# Patient Record
Sex: MALE | Race: OTHER | NOT HISPANIC OR LATINO | URBAN - METROPOLITAN AREA
[De-identification: names, ages, dates, MRNs, and addresses within clinical notes are randomized per-mention and may not be internally consistent; named-entity substitution may affect disease eponyms.]

---

## 2017-02-15 ENCOUNTER — INPATIENT (INPATIENT)
Facility: HOSPITAL | Age: 32
LOS: 5 days | Discharge: HOME | End: 2017-02-21
Attending: HOSPITALIST | Admitting: HOSPITALIST
Payer: COMMERCIAL

## 2017-02-15 VITALS
HEART RATE: 110 BPM | TEMPERATURE: 98 F | RESPIRATION RATE: 20 BRPM | OXYGEN SATURATION: 98 % | HEIGHT: 68 IN | WEIGHT: 164.91 LBS | SYSTOLIC BLOOD PRESSURE: 125 MMHG | DIASTOLIC BLOOD PRESSURE: 86 MMHG

## 2017-02-15 LAB
ALBUMIN SERPL ELPH-MCNC: 3.6 G/DL — SIGNIFICANT CHANGE UP (ref 3.3–5)
ALP SERPL-CCNC: 88 U/L — SIGNIFICANT CHANGE UP (ref 40–120)
ALT FLD-CCNC: 45 U/L — SIGNIFICANT CHANGE UP (ref 12–78)
ANION GAP SERPL CALC-SCNC: 9 MMOL/L — SIGNIFICANT CHANGE UP (ref 5–17)
ANISOCYTOSIS BLD QL: SLIGHT — SIGNIFICANT CHANGE UP
APTT BLD: 30.8 SEC — SIGNIFICANT CHANGE UP (ref 27.5–37.4)
AST SERPL-CCNC: 30 U/L — SIGNIFICANT CHANGE UP (ref 15–37)
BASOPHILS NFR BLD AUTO: 1 % — SIGNIFICANT CHANGE UP (ref 0–2)
BILIRUB SERPL-MCNC: 0.7 MG/DL — SIGNIFICANT CHANGE UP (ref 0.2–1.2)
BLD GP AB SCN SERPL QL: SIGNIFICANT CHANGE UP
BUN SERPL-MCNC: 13 MG/DL — SIGNIFICANT CHANGE UP (ref 7–23)
CALCIUM SERPL-MCNC: 8.5 MG/DL — SIGNIFICANT CHANGE UP (ref 8.5–10.1)
CHLORIDE SERPL-SCNC: 105 MMOL/L — SIGNIFICANT CHANGE UP (ref 96–108)
CO2 SERPL-SCNC: 27 MMOL/L — SIGNIFICANT CHANGE UP (ref 22–31)
CREAT SERPL-MCNC: 0.72 MG/DL — SIGNIFICANT CHANGE UP (ref 0.5–1.3)
EOSINOPHIL NFR BLD AUTO: 3 % — SIGNIFICANT CHANGE UP (ref 0–6)
GLUCOSE SERPL-MCNC: 110 MG/DL — HIGH (ref 70–99)
HCT VFR BLD CALC: 42.7 % — SIGNIFICANT CHANGE UP (ref 39–50)
HGB BLD-MCNC: 15.2 G/DL — SIGNIFICANT CHANGE UP (ref 13–17)
INR BLD: 1 RATIO — SIGNIFICANT CHANGE UP (ref 0.88–1.16)
LACTATE SERPL-SCNC: 0.7 MMOL/L — SIGNIFICANT CHANGE UP (ref 0.7–2)
LIDOCAIN IGE QN: 536 U/L — HIGH (ref 73–393)
LYMPHOCYTES # BLD AUTO: 7 % — LOW (ref 13–44)
MCHC RBC-ENTMCNC: 28.8 PG — SIGNIFICANT CHANGE UP (ref 27–34)
MCHC RBC-ENTMCNC: 35.5 GM/DL — SIGNIFICANT CHANGE UP (ref 32–36)
MCV RBC AUTO: 81 FL — SIGNIFICANT CHANGE UP (ref 80–100)
MONOCYTES NFR BLD AUTO: 4 % — SIGNIFICANT CHANGE UP (ref 2–14)
NEUTROPHILS NFR BLD AUTO: 82 % — HIGH (ref 43–77)
NEUTS BAND # BLD: 3 % — SIGNIFICANT CHANGE UP (ref 0–8)
PLAT MORPH BLD: NORMAL — SIGNIFICANT CHANGE UP
PLATELET # BLD AUTO: 329 K/UL — SIGNIFICANT CHANGE UP (ref 150–400)
POTASSIUM SERPL-MCNC: 3.9 MMOL/L — SIGNIFICANT CHANGE UP (ref 3.5–5.3)
POTASSIUM SERPL-SCNC: 3.9 MMOL/L — SIGNIFICANT CHANGE UP (ref 3.5–5.3)
PROT SERPL-MCNC: 7 GM/DL — SIGNIFICANT CHANGE UP (ref 6–8.3)
PROTHROM AB SERPL-ACNC: 11.2 SEC — SIGNIFICANT CHANGE UP (ref 10–13.1)
RBC # BLD: 5.27 M/UL — SIGNIFICANT CHANGE UP (ref 4.2–5.8)
RBC # FLD: 11.4 % — SIGNIFICANT CHANGE UP (ref 11–15)
RBC BLD AUTO: SIGNIFICANT CHANGE UP
SODIUM SERPL-SCNC: 141 MMOL/L — SIGNIFICANT CHANGE UP (ref 135–145)
WBC # BLD: 21.2 K/UL — HIGH (ref 3.8–10.5)
WBC # FLD AUTO: 21.2 K/UL — HIGH (ref 3.8–10.5)

## 2017-02-15 PROCEDURE — 74177 CT ABD & PELVIS W/CONTRAST: CPT | Mod: 26

## 2017-02-15 PROCEDURE — 99285 EMERGENCY DEPT VISIT HI MDM: CPT

## 2017-02-15 RX ORDER — IOHEXOL 300 MG/ML
30 INJECTION, SOLUTION INTRAVENOUS ONCE
Qty: 0 | Refills: 0 | Status: COMPLETED | OUTPATIENT
Start: 2017-02-15 | End: 2017-02-15

## 2017-02-15 RX ORDER — KETOROLAC TROMETHAMINE 30 MG/ML
30 SYRINGE (ML) INJECTION ONCE
Qty: 0 | Refills: 0 | Status: DISCONTINUED | OUTPATIENT
Start: 2017-02-15 | End: 2017-02-15

## 2017-02-15 RX ORDER — SODIUM CHLORIDE 9 MG/ML
1000 INJECTION INTRAMUSCULAR; INTRAVENOUS; SUBCUTANEOUS
Qty: 0 | Refills: 0 | Status: COMPLETED | OUTPATIENT
Start: 2017-02-15 | End: 2017-02-15

## 2017-02-15 RX ORDER — VANCOMYCIN HCL 1 G
1000 VIAL (EA) INTRAVENOUS ONCE
Qty: 0 | Refills: 0 | Status: COMPLETED | OUTPATIENT
Start: 2017-02-15 | End: 2017-02-15

## 2017-02-15 RX ORDER — PIPERACILLIN AND TAZOBACTAM 4; .5 G/20ML; G/20ML
3.38 INJECTION, POWDER, LYOPHILIZED, FOR SOLUTION INTRAVENOUS ONCE
Qty: 0 | Refills: 0 | Status: COMPLETED | OUTPATIENT
Start: 2017-02-15 | End: 2017-02-15

## 2017-02-15 RX ORDER — SODIUM CHLORIDE 9 MG/ML
3 INJECTION INTRAMUSCULAR; INTRAVENOUS; SUBCUTANEOUS ONCE
Qty: 0 | Refills: 0 | Status: COMPLETED | OUTPATIENT
Start: 2017-02-15 | End: 2017-02-15

## 2017-02-15 RX ADMIN — PIPERACILLIN AND TAZOBACTAM 200 GRAM(S): 4; .5 INJECTION, POWDER, LYOPHILIZED, FOR SOLUTION INTRAVENOUS at 22:30

## 2017-02-15 RX ADMIN — SODIUM CHLORIDE 3 MILLILITER(S): 9 INJECTION INTRAMUSCULAR; INTRAVENOUS; SUBCUTANEOUS at 20:35

## 2017-02-15 RX ADMIN — Medication 30 MILLIGRAM(S): at 22:21

## 2017-02-15 RX ADMIN — Medication 30 MILLIGRAM(S): at 20:40

## 2017-02-15 RX ADMIN — SODIUM CHLORIDE 2000 MILLILITER(S): 9 INJECTION INTRAMUSCULAR; INTRAVENOUS; SUBCUTANEOUS at 20:40

## 2017-02-15 RX ADMIN — SODIUM CHLORIDE 2000 MILLILITER(S): 9 INJECTION INTRAMUSCULAR; INTRAVENOUS; SUBCUTANEOUS at 22:21

## 2017-02-15 RX ADMIN — Medication 250 MILLIGRAM(S): at 23:33

## 2017-02-15 RX ADMIN — IOHEXOL 30 MILLILITER(S): 300 INJECTION, SOLUTION INTRAVENOUS at 20:45

## 2017-02-15 NOTE — ED ADULT TRIAGE NOTE - CHIEF COMPLAINT QUOTE
patient c/o of pain redness ans swelling L groin aria started last night  , patient was send here form urgent care for evaluation cellulitis , patient denied testicular pain or swelling

## 2017-02-15 NOTE — ED PROVIDER NOTE - OBJECTIVE STATEMENT
31yoM; with no signif pmh; now p/w inguinal pain x2 days. patient statees he noticed some bulging over left inguinal area. today area became increasingly swollen, erythematous, and painful. reports mild chills today. denies n/v/d. c/o mild abd pain. denies dysuria, hematuria, frequency, urgency. denies testicular pain.

## 2017-02-15 NOTE — ED PROVIDER NOTE - PHYSICAL EXAMINATION
General:     NAD  Head:     NC/AT, EOMI, oral mucosa moist  Neck:     trachea midline  Lungs:     CTA b/l, no w/r/r  CVS:     S1S2, RRR, no m/g/r  Abd:     +BS, ttp @ LLQ, no rebound, +voluntary guarding, s/nd, no organomegaly  Genital Exam (chaperoned by ED Ludy Day):  nt over bilateral testicles.  ttp over left inguinal area with swelling and erythema  Ext:    2+ radial and pedal pulses, no c/c/e  Neuro: AAOx3, no sensory/motor deficits

## 2017-02-16 DIAGNOSIS — G40.909 EPILEPSY, UNSPECIFIED, NOT INTRACTABLE, WITHOUT STATUS EPILEPTICUS: ICD-10-CM

## 2017-02-16 DIAGNOSIS — L03.314 CELLULITIS OF GROIN: ICD-10-CM

## 2017-02-16 DIAGNOSIS — D72.829 ELEVATED WHITE BLOOD CELL COUNT, UNSPECIFIED: ICD-10-CM

## 2017-02-16 LAB
ANION GAP SERPL CALC-SCNC: 10 MMOL/L — SIGNIFICANT CHANGE UP (ref 5–17)
APPEARANCE UR: CLEAR — SIGNIFICANT CHANGE UP
BASOPHILS # BLD AUTO: 0.2 K/UL — SIGNIFICANT CHANGE UP (ref 0–0.2)
BASOPHILS NFR BLD AUTO: 1.1 % — SIGNIFICANT CHANGE UP (ref 0–2)
BILIRUB UR-MCNC: NEGATIVE — SIGNIFICANT CHANGE UP
BUN SERPL-MCNC: 8 MG/DL — SIGNIFICANT CHANGE UP (ref 7–23)
CALCIUM SERPL-MCNC: 8.2 MG/DL — LOW (ref 8.5–10.1)
CHLORIDE SERPL-SCNC: 111 MMOL/L — HIGH (ref 96–108)
CO2 SERPL-SCNC: 23 MMOL/L — SIGNIFICANT CHANGE UP (ref 22–31)
COLOR SPEC: YELLOW — SIGNIFICANT CHANGE UP
CREAT SERPL-MCNC: 0.64 MG/DL — SIGNIFICANT CHANGE UP (ref 0.5–1.3)
DIFF PNL FLD: NEGATIVE — SIGNIFICANT CHANGE UP
EOSINOPHIL # BLD AUTO: 0.5 K/UL — SIGNIFICANT CHANGE UP (ref 0–0.5)
EOSINOPHIL NFR BLD AUTO: 2.9 % — SIGNIFICANT CHANGE UP (ref 0–6)
GLUCOSE SERPL-MCNC: 110 MG/DL — HIGH (ref 70–99)
GLUCOSE UR QL: NEGATIVE MG/DL — SIGNIFICANT CHANGE UP
HCT VFR BLD CALC: 41.3 % — SIGNIFICANT CHANGE UP (ref 39–50)
HGB BLD-MCNC: 14.5 G/DL — SIGNIFICANT CHANGE UP (ref 13–17)
KETONES UR-MCNC: NEGATIVE — SIGNIFICANT CHANGE UP
LACTATE SERPL-SCNC: 0.6 MMOL/L — LOW (ref 0.7–2)
LEUKOCYTE ESTERASE UR-ACNC: NEGATIVE — SIGNIFICANT CHANGE UP
LYMPHOCYTES # BLD AUTO: 12.8 % — LOW (ref 13–44)
LYMPHOCYTES # BLD AUTO: 2.3 K/UL — SIGNIFICANT CHANGE UP (ref 1–3.3)
MCHC RBC-ENTMCNC: 28.2 PG — SIGNIFICANT CHANGE UP (ref 27–34)
MCHC RBC-ENTMCNC: 35.2 GM/DL — SIGNIFICANT CHANGE UP (ref 32–36)
MCV RBC AUTO: 80.2 FL — SIGNIFICANT CHANGE UP (ref 80–100)
MONOCYTES # BLD AUTO: 1.3 K/UL — HIGH (ref 0–0.9)
MONOCYTES NFR BLD AUTO: 7.3 % — SIGNIFICANT CHANGE UP (ref 2–14)
NEUTROPHILS # BLD AUTO: 13.9 K/UL — HIGH (ref 1.8–7.4)
NEUTROPHILS NFR BLD AUTO: 76 % — SIGNIFICANT CHANGE UP (ref 43–77)
NITRITE UR-MCNC: NEGATIVE — SIGNIFICANT CHANGE UP
PH UR: 7 — SIGNIFICANT CHANGE UP (ref 4.8–8)
PLATELET # BLD AUTO: 299 K/UL — SIGNIFICANT CHANGE UP (ref 150–400)
POTASSIUM SERPL-MCNC: 3.9 MMOL/L — SIGNIFICANT CHANGE UP (ref 3.5–5.3)
POTASSIUM SERPL-SCNC: 3.9 MMOL/L — SIGNIFICANT CHANGE UP (ref 3.5–5.3)
PROT UR-MCNC: NEGATIVE MG/DL — SIGNIFICANT CHANGE UP
RBC # BLD: 5.15 M/UL — SIGNIFICANT CHANGE UP (ref 4.2–5.8)
RBC # FLD: 11.2 % — SIGNIFICANT CHANGE UP (ref 11–15)
SODIUM SERPL-SCNC: 144 MMOL/L — SIGNIFICANT CHANGE UP (ref 135–145)
SP GR SPEC: 1.01 — SIGNIFICANT CHANGE UP (ref 1.01–1.02)
UROBILINOGEN FLD QL: NEGATIVE MG/DL — SIGNIFICANT CHANGE UP
WBC # BLD: 18.3 K/UL — HIGH (ref 3.8–10.5)
WBC # FLD AUTO: 18.3 K/UL — HIGH (ref 3.8–10.5)

## 2017-02-16 PROCEDURE — 99222 1ST HOSP IP/OBS MODERATE 55: CPT

## 2017-02-16 PROCEDURE — 99223 1ST HOSP IP/OBS HIGH 75: CPT

## 2017-02-16 RX ORDER — PIPERACILLIN AND TAZOBACTAM 4; .5 G/20ML; G/20ML
3.38 INJECTION, POWDER, LYOPHILIZED, FOR SOLUTION INTRAVENOUS EVERY 8 HOURS
Qty: 0 | Refills: 0 | Status: DISCONTINUED | OUTPATIENT
Start: 2017-02-16 | End: 2017-02-21

## 2017-02-16 RX ORDER — ACETAMINOPHEN 500 MG
650 TABLET ORAL EVERY 6 HOURS
Qty: 0 | Refills: 0 | Status: DISCONTINUED | OUTPATIENT
Start: 2017-02-16 | End: 2017-02-21

## 2017-02-16 RX ORDER — VANCOMYCIN HCL 1 G
1000 VIAL (EA) INTRAVENOUS EVERY 12 HOURS
Qty: 0 | Refills: 0 | Status: DISCONTINUED | OUTPATIENT
Start: 2017-02-16 | End: 2017-02-16

## 2017-02-16 RX ORDER — ACETAMINOPHEN 500 MG
975 TABLET ORAL ONCE
Qty: 0 | Refills: 0 | Status: COMPLETED | OUTPATIENT
Start: 2017-02-16 | End: 2017-02-16

## 2017-02-16 RX ORDER — MORPHINE SULFATE 50 MG/1
2 CAPSULE, EXTENDED RELEASE ORAL EVERY 6 HOURS
Qty: 0 | Refills: 0 | Status: DISCONTINUED | OUTPATIENT
Start: 2017-02-16 | End: 2017-02-19

## 2017-02-16 RX ORDER — VANCOMYCIN HCL 1 G
1000 VIAL (EA) INTRAVENOUS EVERY 8 HOURS
Qty: 0 | Refills: 0 | Status: DISCONTINUED | OUTPATIENT
Start: 2017-02-16 | End: 2017-02-18

## 2017-02-16 RX ORDER — NICOTINE POLACRILEX 2 MG
1 GUM BUCCAL DAILY
Qty: 0 | Refills: 0 | Status: DISCONTINUED | OUTPATIENT
Start: 2017-02-16 | End: 2017-02-21

## 2017-02-16 RX ORDER — LEVETIRACETAM 250 MG/1
500 TABLET, FILM COATED ORAL
Qty: 0 | Refills: 0 | Status: DISCONTINUED | OUTPATIENT
Start: 2017-02-16 | End: 2017-02-21

## 2017-02-16 RX ADMIN — Medication 250 MILLIGRAM(S): at 21:24

## 2017-02-16 RX ADMIN — Medication 975 MILLIGRAM(S): at 00:35

## 2017-02-16 RX ADMIN — MORPHINE SULFATE 2 MILLIGRAM(S): 50 CAPSULE, EXTENDED RELEASE ORAL at 23:45

## 2017-02-16 RX ADMIN — Medication 650 MILLIGRAM(S): at 10:22

## 2017-02-16 RX ADMIN — PIPERACILLIN AND TAZOBACTAM 25 GRAM(S): 4; .5 INJECTION, POWDER, LYOPHILIZED, FOR SOLUTION INTRAVENOUS at 21:24

## 2017-02-16 RX ADMIN — MORPHINE SULFATE 2 MILLIGRAM(S): 50 CAPSULE, EXTENDED RELEASE ORAL at 16:43

## 2017-02-16 RX ADMIN — MORPHINE SULFATE 2 MILLIGRAM(S): 50 CAPSULE, EXTENDED RELEASE ORAL at 23:28

## 2017-02-16 RX ADMIN — PIPERACILLIN AND TAZOBACTAM 25 GRAM(S): 4; .5 INJECTION, POWDER, LYOPHILIZED, FOR SOLUTION INTRAVENOUS at 05:14

## 2017-02-16 RX ADMIN — LEVETIRACETAM 500 MILLIGRAM(S): 250 TABLET, FILM COATED ORAL at 05:14

## 2017-02-16 RX ADMIN — MORPHINE SULFATE 2 MILLIGRAM(S): 50 CAPSULE, EXTENDED RELEASE ORAL at 16:58

## 2017-02-16 RX ADMIN — PIPERACILLIN AND TAZOBACTAM 25 GRAM(S): 4; .5 INJECTION, POWDER, LYOPHILIZED, FOR SOLUTION INTRAVENOUS at 14:15

## 2017-02-16 RX ADMIN — Medication 650 MILLIGRAM(S): at 09:21

## 2017-02-16 RX ADMIN — Medication 250 MILLIGRAM(S): at 13:09

## 2017-02-16 NOTE — H&P ADULT. - FAMILY HISTORY
Mother  Still living? Yes, Estimated age: Age Unknown  Family history of essential hypertension, Age at diagnosis: Age Unknown

## 2017-02-16 NOTE — H&P ADULT. - MUSCULOSKELETAL
details… detailed exam no joint erythema/no joint swelling/normal strength/no calf tenderness/ROM intact/no joint warmth

## 2017-02-16 NOTE — H&P ADULT. - NEGATIVE NEUROLOGICAL SYMPTOMS
no transient paralysis/no weakness/no vertigo/no loss of consciousness/no headache/no paresthesias/no confusion/no syncope/no generalized seizures

## 2017-02-16 NOTE — H&P ADULT. - PROBLEM SELECTOR PLAN 2
pt says not taking medication, was evaluated by Dr Hancock in past, had nl MRI.  obtain previous medical records.

## 2017-02-16 NOTE — H&P ADULT. - RADIOLOGY RESULTS AND INTERPRETATION
CTT abd/pelvis :Asymmetric swelling of the soft tissues of the left groin with   infiltrative changes of the subcutaneous fat and several adjacent   subcentimeter inguinal lymph nodes likely reflecting a cellulitis. No   left-sided inguinal hernia. No rim-enhancing fluid collection to suggest   abscess.

## 2017-02-16 NOTE — H&P ADULT. - NEUROLOGICAL DETAILS
normal strength/deep reflexes intact/responds to verbal commands/sensation intact/alert and oriented x 3/cranial nerves intact

## 2017-02-16 NOTE — H&P ADULT. - HISTORY OF PRESENT ILLNESS
32 y/o man with no PMH now p/w inguinal pain x2 days. Patient states he noticed some bulging over left inguinal area day before, like pimple,  today area became increasingly swollen, red, and painful with mild chills today, did not have subjective fever the day before. No previous injury, no history frequent infections, no sick contacts or travel.  No testicular pain or dysuria or frequency.  Denies n/v/d, denies dysuria, hematuria, frequency, urgency. 30 y/o man with no PMH( possible seizure disorder) now p/w inguinal pain x2 days. Patient states he noticed some bulging over left inguinal area day before, like pimple,  today area became increasingly swollen, red, and painful with mild chills today, did not have subjective fever the day before. No previous injury, no history frequent infections, no sick contacts or travel.  No testicular pain or dysuria or frequency.  Denies n/v/d, denies dysuria, hematuria, frequency, urgency.

## 2017-02-16 NOTE — H&P ADULT. - RS GEN PE MLT RESP DETAILS PC
respirations non-labored/no rhonchi/airway patent/good air movement/no wheezes/breath sounds equal/clear to auscultation bilaterally/no rales

## 2017-02-16 NOTE — H&P ADULT. - GASTROINTESTINAL DETAILS
no distention/no guarding/bowel sounds normal/nontender/no masses palpable/soft/no rebound tenderness

## 2017-02-16 NOTE — CONSULT NOTE ADULT - SUBJECTIVE AND OBJECTIVE BOX
Infectious Diseases - Attending at Dr. Santana    HPI:  30 y/o man with no PMH( possible seizure disorder) now p/w inguinal pain x2 days. Patient states he noticed some bulging over left inguinal area day before, like pimple,  today area became increasingly swollen, red, and painful with mild chills today, did not have subjective fever the day before. No previous injury, no history frequent infections, no sick contacts or travel.  No testicular pain or dysuria or frequency.  Denies n/v/d, denies dysuria, hematuria, frequency, urgency. (2017 03:10)  Per pt pain and swelling increasing.vanco added today by medicine      PAST MEDICAL & SURGICAL HISTORY:  No pertinent past medical history  No significant past surgical history      Allergies    No Known Allergies    Intolerances        FAMILY HISTORY:  Family history of essential hypertension (Mother)      SOCIAL HISTORY:smoking +    REVIEW OF SYSTEMS:    Constitutional: No fever, weight loss or fatigue  Eyes: No eye pain, visual disturbances, or discharge  ENT:  No difficulty hearing, tinnitus, vertigo; No sinus or throat pain  Neck: No pain or stiffness  Respiratory: No cough, wheezing, chills or hemoptysis  Cardiovascular: No chest pain, palpitations, shortness of breath, dizziness or leg swelling  Gastrointestinal: No abdominal or epigastric pain. No nausea, vomiting or hematemesis; No diarrhea or constipation. No melena or hematochezia.  Genitourinary: No dysuria, frequency, hematuria or incontinence  Rectal: No pain, hemorrhoids or incontinence  Neurological: No headaches, memory loss, loss of strength, numbness or tremors  Skin:left groin swelling with pain  Lymph Nodes: left inguinal gland tenderness  Endocrine: No heat or cold intolerance; No hair loss  Musculoskeletal: No joint pain or swelling; No muscle, back or extremity pain  Psychiatric: No depression, anxiety, mood swings or difficulty sleeping  Heme/Lymph: No easy bruising or bleeding gums  Allergy and Immunologic: No hives or eczema    MEDICATIONS  (STANDING):  piperacillin/tazobactam IVPB. 3.375Gram(s) IV Intermittent every 8 hours  levETIRAcetam 500milliGRAM(s) Oral two times a day  nicotine -  14 mG/24Hr(s) Patch 1patch Transdermal daily  vancomycin  IVPB 1000milliGRAM(s) IV Intermittent every 8 hours    MEDICATIONS  (PRN):  acetaminophen   Tablet. 650milliGRAM(s) Oral every 6 hours PRN Mild Pain (1 - 3)  oxyCODONE  5 mG/acetaminophen 325 mG 1Tablet(s) Oral every 4 hours PRN Moderate Pain (4 - 6)      Vital Signs Last 24 Hrs  T(C): 37, Max: 37.7 (02-15 @ 23:37)  T(F): 98.6, Max: 99.9 (-15 @ 23:37)  HR: 86 (82 - 110)  BP: 126/87 (110/64 - 126/87)  BP(mean): --  RR: 22 (16 - 22)  SpO2: 98% (96% - 99%)    PHYSICAL EXAM:    Constitutional: NAD, well-groomed, well-developed  HEENT: PERRLA, EOMI, Normal Hearing, MMM  Neck: No LAD, No JVD  Back: Normal spine flexure, No CVA tenderness  Respiratory: CTAB/L   Cardiovascular: S1 and S2, RRR, no M/G/R  Gastrointestinal: BS+, soft, NT/ND  Extremities: No peripheral edema  Vascular: 2+ peripheral pulses  Neurological: A/O x 3, no focal deficits  Skin: left groin swelling with erythema ,mild induration  with tender lymphadenopathy with ? fluctance  No open wounds  tattoos on skin    LABS:                        14.5   18.3  )-----------( 299      ( 2017 06:18 )             41.3     2017 06:18    144    |  111    |  8      ----------------------------<  110    3.9     |  23     |  0.64     Ca    8.2        2017 06:18    TPro  7.0    /  Alb  3.6    /  TBili  0.7    /  DBili  x      /  AST  30     /  ALT  45     /  AlkPhos  88     15 Feb 2017 20:52    PT/INR - ( 15 Feb 2017 20:52 )   PT: 11.2 sec;   INR: 1.00 ratio         PTT - ( 15 Feb 2017 20:52 )  PTT:30.8 sec  Urinalysis Basic - ( 2017 00:10 )    Color: Yellow / Appearance: Clear / S.010 / pH: x  Gluc: x / Ketone: Negative  / Bili: Negative / Urobili: Negative mg/dL   Blood: x / Protein: Negative mg/dL / Nitrite: Negative   Leuk Esterase: Negative / RBC: x / WBC x   Sq Epi: x / Non Sq Epi: x / Bacteria: x        MICROBIOLOGY:  RECENT CULTURES:        RADIOLOGY & ADDITIONAL STUDIES:      IMPRESSION:    Asymmetric swelling of the soft tissues of the left groin with   infiltrative changes of the subcutaneous fat and several adjacent   subcentimeter inguinal lymph nodes likely reflecting a cellulitis. No   left-sided inguinal hernia. No rim-enhancing fluid collection to suggest   abscess.

## 2017-02-17 LAB
ALBUMIN SERPL ELPH-MCNC: 3.7 G/DL — SIGNIFICANT CHANGE UP (ref 3.3–5)
ALP SERPL-CCNC: 85 U/L — SIGNIFICANT CHANGE UP (ref 40–120)
ALT FLD-CCNC: 82 U/L — HIGH (ref 12–78)
ANION GAP SERPL CALC-SCNC: 6 MMOL/L — SIGNIFICANT CHANGE UP (ref 5–17)
AST SERPL-CCNC: 21 U/L — SIGNIFICANT CHANGE UP (ref 15–37)
BILIRUB SERPL-MCNC: 1 MG/DL — SIGNIFICANT CHANGE UP (ref 0.2–1.2)
BUN SERPL-MCNC: 5 MG/DL — LOW (ref 7–23)
CALCIUM SERPL-MCNC: 9.4 MG/DL — SIGNIFICANT CHANGE UP (ref 8.5–10.1)
CHLORIDE SERPL-SCNC: 103 MMOL/L — SIGNIFICANT CHANGE UP (ref 96–108)
CO2 SERPL-SCNC: 30 MMOL/L — SIGNIFICANT CHANGE UP (ref 22–31)
CREAT SERPL-MCNC: 0.88 MG/DL — SIGNIFICANT CHANGE UP (ref 0.5–1.3)
GLUCOSE SERPL-MCNC: 98 MG/DL — SIGNIFICANT CHANGE UP (ref 70–99)
HCT VFR BLD CALC: 43.5 % — SIGNIFICANT CHANGE UP (ref 39–50)
HGB BLD-MCNC: 15 G/DL — SIGNIFICANT CHANGE UP (ref 13–17)
MCHC RBC-ENTMCNC: 28.2 PG — SIGNIFICANT CHANGE UP (ref 27–34)
MCHC RBC-ENTMCNC: 34.5 GM/DL — SIGNIFICANT CHANGE UP (ref 32–36)
MCV RBC AUTO: 81.7 FL — SIGNIFICANT CHANGE UP (ref 80–100)
PLATELET # BLD AUTO: 328 K/UL — SIGNIFICANT CHANGE UP (ref 150–400)
POTASSIUM SERPL-MCNC: 3.5 MMOL/L — SIGNIFICANT CHANGE UP (ref 3.5–5.3)
POTASSIUM SERPL-SCNC: 3.5 MMOL/L — SIGNIFICANT CHANGE UP (ref 3.5–5.3)
PROT SERPL-MCNC: 7.4 GM/DL — SIGNIFICANT CHANGE UP (ref 6–8.3)
RBC # BLD: 5.33 M/UL — SIGNIFICANT CHANGE UP (ref 4.2–5.8)
RBC # FLD: 11.4 % — SIGNIFICANT CHANGE UP (ref 11–15)
SODIUM SERPL-SCNC: 139 MMOL/L — SIGNIFICANT CHANGE UP (ref 135–145)
VANCOMYCIN TROUGH SERPL-MCNC: 14.5 UG/ML — SIGNIFICANT CHANGE UP (ref 10–20)
WBC # BLD: 18.7 K/UL — HIGH (ref 3.8–10.5)
WBC # FLD AUTO: 18.7 K/UL — HIGH (ref 3.8–10.5)

## 2017-02-17 PROCEDURE — 99232 SBSQ HOSP IP/OBS MODERATE 35: CPT

## 2017-02-17 PROCEDURE — 76857 US EXAM PELVIC LIMITED: CPT | Mod: 26

## 2017-02-17 PROCEDURE — 99233 SBSQ HOSP IP/OBS HIGH 50: CPT

## 2017-02-17 RX ORDER — ACETAMINOPHEN 500 MG
650 TABLET ORAL EVERY 6 HOURS
Qty: 0 | Refills: 0 | Status: DISCONTINUED | OUTPATIENT
Start: 2017-02-17 | End: 2017-02-21

## 2017-02-17 RX ORDER — ONDANSETRON 8 MG/1
4 TABLET, FILM COATED ORAL EVERY 6 HOURS
Qty: 0 | Refills: 0 | Status: DISCONTINUED | OUTPATIENT
Start: 2017-02-17 | End: 2017-02-21

## 2017-02-17 RX ADMIN — PIPERACILLIN AND TAZOBACTAM 25 GRAM(S): 4; .5 INJECTION, POWDER, LYOPHILIZED, FOR SOLUTION INTRAVENOUS at 06:14

## 2017-02-17 RX ADMIN — Medication 250 MILLIGRAM(S): at 13:19

## 2017-02-17 RX ADMIN — Medication 650 MILLIGRAM(S): at 23:58

## 2017-02-17 RX ADMIN — MORPHINE SULFATE 2 MILLIGRAM(S): 50 CAPSULE, EXTENDED RELEASE ORAL at 08:30

## 2017-02-17 RX ADMIN — Medication 250 MILLIGRAM(S): at 06:14

## 2017-02-17 RX ADMIN — ONDANSETRON 4 MILLIGRAM(S): 8 TABLET, FILM COATED ORAL at 17:53

## 2017-02-17 RX ADMIN — Medication 1 PATCH: at 11:40

## 2017-02-17 RX ADMIN — MORPHINE SULFATE 2 MILLIGRAM(S): 50 CAPSULE, EXTENDED RELEASE ORAL at 08:00

## 2017-02-17 RX ADMIN — PIPERACILLIN AND TAZOBACTAM 25 GRAM(S): 4; .5 INJECTION, POWDER, LYOPHILIZED, FOR SOLUTION INTRAVENOUS at 21:21

## 2017-02-17 RX ADMIN — PIPERACILLIN AND TAZOBACTAM 25 GRAM(S): 4; .5 INJECTION, POWDER, LYOPHILIZED, FOR SOLUTION INTRAVENOUS at 16:33

## 2017-02-17 RX ADMIN — Medication 250 MILLIGRAM(S): at 21:21

## 2017-02-17 NOTE — PROGRESS NOTE ADULT - SUBJECTIVE AND OBJECTIVE BOX
Patient is a 31y old  Male who presents with a chief complaint of Pain and swelling of left groin today. (2017 03:10)      INTERVAL HPI/OVERNIGHT EVENTS: no acute events overnight     MEDICATIONS  (STANDING):  piperacillin/tazobactam IVPB. 3.375Gram(s) IV Intermittent every 8 hours  levETIRAcetam 500milliGRAM(s) Oral two times a day  nicotine -  14 mG/24Hr(s) Patch 1patch Transdermal daily  vancomycin  IVPB 1000milliGRAM(s) IV Intermittent every 8 hours    MEDICATIONS  (PRN):  acetaminophen   Tablet. 650milliGRAM(s) Oral every 6 hours PRN Mild Pain (1 - 3)  morphine  - Injectable 2milliGRAM(s) IV Push every 6 hours PRN Severe Pain (7 - 10)  oxyCODONE  5 mG/acetaminophen 325 mG 2Tablet(s) Oral every 4 hours PRN Moderate Pain (4 - 6)      Allergies    No Known Allergies    Intolerances        REVIEW OF SYSTEMS:  CONSTITUTIONAL: No fever, weight loss, or fatigue  EYES: No eye pain, visual disturbances, or discharge  ENMT:  No difficulty hearing, tinnitus, vertigo; No sinus or throat pain  NECK: No pain or stiffness  BREASTS: No pain, masses, or nipple discharge  RESPIRATORY: No cough, wheezing, chills or hemoptysis; No shortness of breath  CARDIOVASCULAR: No chest pain, palpitations, dizziness, or leg swelling  GASTROINTESTINAL: No abdominal or epigastric pain. No nausea, vomiting, or hematemesis; No diarrhea or constipation. No melena or hematochezia.  GENITOURINARY: No dysuria, frequency, hematuria, or incontinence  NEUROLOGICAL: No headaches, memory loss, loss of strength, numbness, or tremors  SKIN: No itching, burning, rashes, or lesions   LYMPH NODES: swelling the left groin   ENDOCRINE: No heat or cold intolerance; No hair loss  MUSCULOSKELETAL: No joint pain or swelling; No muscle, back, or extremity pain  PSYCHIATRIC: No depression, anxiety, mood swings, or difficulty sleeping  HEME/LYMPH: No easy bruising, or bleeding gums  ALLERGY AND IMMUNOLOGIC: No hives or eczema    Vital Signs Last 24 Hrs  T(C): 36.4, Max: 37 ( @ 11:41)  T(F): 97.6, Max: 98.6 ( @ 11:41)  HR: 94 (76 - 96)  BP: 119/80 (119/80 - 136/87)  BP(mean): --  RR: 16 (16 - 22)  SpO2: 98% (96% - 98%)    PHYSICAL EXAM:  GENERAL: NAD, well-groomed, well-developed  HEAD:  Atraumatic, Normocephalic  EYES: EOMI, PERRLA, conjunctiva and sclera clear  ENMT: No tonsillar erythema, exudates, or enlargement; Moist mucous membranes, Good dentition, No lesions  NECK: Supple, No JVD, Normal thyroid  NERVOUS SYSTEM:  Alert & Oriented X3, Good concentration; Motor Strength 5/5 B/L upper and lower extremities; DTRs 2+ intact and symmetric  CHEST/LUNG: Clear to percussion bilaterally; No rales, rhonchi, wheezing, or rubs  HEART: Regular rate and rhythm; No murmurs, rubs, or gallops  ABDOMEN:tender indurard and fluctuant mass left groin with overlying erythema   EXTREMITIES:  2+ Peripheral Pulses, No clubbing, cyanosis, or edema  LYMPH: No lymphadenopathy noted  SKIN: No rashes or lesions    LABS:                        15.0   18.7  )-----------( 328      ( 2017 08:41 )             43.5     2017 08:41    139    |  103    |  5      ----------------------------<  98     3.5     |  30     |  0.88     Ca    9.4        2017 08:41    TPro  7.4    /  Alb  3.7    /  TBili  1.0    /  DBili  x      /  AST  21     /  ALT  82     /  AlkPhos  85     2017 08:41    PT/INR - ( 15 Feb 2017 20:52 )   PT: 11.2 sec;   INR: 1.00 ratio         PTT - ( 15 Feb 2017 20:52 )  PTT:30.8 sec  Urinalysis Basic - ( 2017 00:10 )    Color: Yellow / Appearance: Clear / S.010 / pH: x  Gluc: x / Ketone: Negative  / Bili: Negative / Urobili: Negative mg/dL   Blood: x / Protein: Negative mg/dL / Nitrite: Negative   Leuk Esterase: Negative / RBC: x / WBC x   Sq Epi: x / Non Sq Epi: x / Bacteria: x      CAPILLARY BLOOD GLUCOSE      RADIOLOGY & ADDITIONAL TESTS:    Imaging Personally Reviewed:  [X ] YES  [ ] NO    Consultant(s) Notes Reviewed:  [X] YES  [ ] NO    Care Discussed with Consultants/Other Providers [ ] YES  [ ] NO

## 2017-02-17 NOTE — CONSULT NOTE ADULT - ASSESSMENT
I have ordered a sonogram of the area to see if there is any fluid.  If no fluid or abscess gthen IV ABX only treatment at present.

## 2017-02-17 NOTE — PROGRESS NOTE ADULT - SUBJECTIVE AND OBJECTIVE BOX
Patient is a 31y old  Male who presents with a chief complaint of Pain and swelling of left groin today. (2017 03:10)      INTERVAL HPI / OVERNIGHT EVENTS: left groin pain continues,no fever    MEDICATIONS  (STANDING):  piperacillin/tazobactam IVPB. 3.375Gram(s) IV Intermittent every 8 hours  levETIRAcetam 500milliGRAM(s) Oral two times a day  nicotine -  14 mG/24Hr(s) Patch 1patch Transdermal daily  vancomycin  IVPB 1000milliGRAM(s) IV Intermittent every 8 hours    MEDICATIONS  (PRN):  acetaminophen   Tablet. 650milliGRAM(s) Oral every 6 hours PRN Mild Pain (1 - 3)  morphine  - Injectable 2milliGRAM(s) IV Push every 6 hours PRN Severe Pain (7 - 10)  oxyCODONE  5 mG/acetaminophen 325 mG 2Tablet(s) Oral every 4 hours PRN Moderate Pain (4 - 6)  ondansetron Injectable 4milliGRAM(s) IV Push every 6 hours PRN Nausea and/or Vomiting      Vital Signs Last 24 Hrs  T(C): 37.7, Max: 37.7 ( @ 19:06)  T(F): 99.8, Max: 99.8 ( @ 19:06)  HR: 75 (75 - 96)  BP: 141/91 (119/80 - 141/91)  BP(mean): --  RR: 16 (15 - 18)  SpO2: 100% (96% - 100%)    PHYSICAL EXAM:    Constitutional: NAD, well-groomed, well-developed  Respiratory: CTAB/L  Cardiovascular: S1 and S2, RRR, no M/G/R  Gastrointestinal: BS+, soft, NT/ND  Extremities: No peripheral edema  Vascular: 2+ peripheral pulses  Skin: left groing swellin with erythema ,tenderness,fluctuance +      LABS:                        15.0   18.7  )-----------( 328      ( 2017 08:41 )             43.5     2017 08:41    139    |  103    |  5      ----------------------------<  98     3.5     |  30     |  0.88     Ca    9.4        2017 08:41    TPro  7.4    /  Alb  3.7    /  TBili  1.0    /  DBili  x      /  AST  21     /  ALT  82     /  AlkPhos  85     2017 08:41    PT/INR - ( 15 Feb 2017 20:52 )   PT: 11.2 sec;   INR: 1.00 ratio         PTT - ( 15 Feb 2017 20:52 )  PTT:30.8 sec  Urinalysis Basic - ( 2017 00:10 )    Color: Yellow / Appearance: Clear / S.010 / pH: x  Gluc: x / Ketone: Negative  / Bili: Negative / Urobili: Negative mg/dL   Blood: x / Protein: Negative mg/dL / Nitrite: Negative   Leuk Esterase: Negative / RBC: x / WBC x   Sq Epi: x / Non Sq Epi: x / Bacteria: x          MICROBIOLOGY:  RECENT CULTURES:   .Blood Blood XXXX XXXX   No growth to date.          RADIOLOGY & ADDITIONAL STUDIES:

## 2017-02-17 NOTE — CONSULT NOTE ADULT - SUBJECTIVE AND OBJECTIVE BOX
Dr. Donny Felix contact information [phone]    GENERAL SURGERY CONSULT NOTE    Patient is a 31y old  Male who presents with a chief complaint of Pain and swelling of left groin today. (2017 03:10)      HPI:  30 y/o man with no PMH( possible seizure disorder) now p/w inguinal pain x2 days. Patient states he noticed some bulging over left inguinal area day before, like pimple,  today area became increasingly swollen, red, and painful with mild chills today, did not have subjective fever the day before. No previous injury, no history frequent infections, no sick contacts or travel.  No testicular pain or dysuria or frequency.  Denies n/v/d, denies dysuria, hematuria, frequency, urgency. (2017 03:10)      PAST MEDICAL & SURGICAL HISTORY:  No pertinent past medical history  No significant past surgical history      Review of Systems:    I have reviewed 9 systems with the patient and the only positive findings were     MEDICATIONS  (STANDING):  piperacillin/tazobactam IVPB. 3.375Gram(s) IV Intermittent every 8 hours  levETIRAcetam 500milliGRAM(s) Oral two times a day  nicotine -  14 mG/24Hr(s) Patch 1patch Transdermal daily  vancomycin  IVPB 1000milliGRAM(s) IV Intermittent every 8 hours    MEDICATIONS  (PRN):  acetaminophen   Tablet. 650milliGRAM(s) Oral every 6 hours PRN Mild Pain (1 - 3)  morphine  - Injectable 2milliGRAM(s) IV Push every 6 hours PRN Severe Pain (7 - 10)  oxyCODONE  5 mG/acetaminophen 325 mG 2Tablet(s) Oral every 4 hours PRN Moderate Pain (4 - 6)      Allergies    No Known Allergies    Intolerances        SOCIAL HISTORY          Smoking: Yes [ ]  No [x ]   ______pk yrs          ETOH  Yes [ ]  No [x ]  Social [ ]          DRUGS:  Yes [ ]  No [x ]  if so what______________    FAMILY HISTORY:  Family history of essential hypertension (Mother)      Vital Signs Last 24 Hrs  T(C): 36.4, Max: 37 ( @ 11:41)  T(F): 97.6, Max: 98.6 ( @ 11:41)  HR: 94 (76 - 96)  BP: 119/80 (119/80 - 136/87)  BP(mean): --  RR: 16 (16 - 22)  SpO2: 98% (96% - 98%)    I&O's Detail    I & Os for current day (as of 2017 09:55)  =============================================  IN:    Oral Fluid: 240 ml    Total IN: 240 ml  ---------------------------------------------  OUT:    Voided: 400 ml    Total OUT: 400 ml  ---------------------------------------------  Total NET: -160 ml      Physical Exam:    General:  Appears stated age, well-groomed, well-nourished, no distress  Eyes : EUSEBIO  HENT:  WNL, no JVD  Chest:  clear breath sounds  Cardiovascular:  Regular rate & rhythm  Abdomen:  there is a tender inflamatory mass in the left groin - the left cord and teesticle are normal - physical exam of the left groin reveals no inguinal hernia  Extremities:  Gait & station:    Skin:  No rash  Musculoskeletal:  normal strength  Neuro/Psych:  Alert, oriented tp time, place and person       LABS:                        15.0   18.7  )-----------( 328      ( 2017 08:41 )             43.5     2017 08:41    139    |  103    |  5      ----------------------------<  98     3.5     |  30     |  0.88     Ca    9.4        2017 08:41    TPro  7.4    /  Alb  3.7    /  TBili  1.0    /  DBili  x      /  AST  21     /  ALT  82     /  AlkPhos  85     2017 08:41    PT/INR - ( 15 Feb 2017 20:52 )   PT: 11.2 sec;   INR: 1.00 ratio         PTT - ( 15 Feb 2017 20:52 )  PTT:30.8 sec  Urinalysis Basic - ( 2017 00:10 )    Color: Yellow / Appearance: Clear / S.010 / pH: x  Gluc: x / Ketone: Negative  / Bili: Negative / Urobili: Negative mg/dL   Blood: x / Protein: Negative mg/dL / Nitrite: Negative   Leuk Esterase: Negative / RBC: x / WBC x   Sq Epi: x / Non Sq Epi: x / Bacteria: x        RADIOLOGY & ADDITIONAL STUDIES:  IMPRESSION:    Asymmetric swelling of the soft tissues of the left groin with   infiltrative changes of the subcutaneous fat and several adjacent   subcentimeter inguinal lymph nodes likely reflecting a cellulitis. No   left-sided inguinal hernia. No rim-enhancing fluid collection to suggest   abscess.

## 2017-02-18 LAB
ALBUMIN SERPL ELPH-MCNC: 3.2 G/DL — LOW (ref 3.3–5)
ALP SERPL-CCNC: 97 U/L — SIGNIFICANT CHANGE UP (ref 40–120)
ALT FLD-CCNC: 64 U/L — SIGNIFICANT CHANGE UP (ref 12–78)
ANION GAP SERPL CALC-SCNC: 8 MMOL/L — SIGNIFICANT CHANGE UP (ref 5–17)
AST SERPL-CCNC: 32 U/L — SIGNIFICANT CHANGE UP (ref 15–37)
BILIRUB SERPL-MCNC: 1 MG/DL — SIGNIFICANT CHANGE UP (ref 0.2–1.2)
BUN SERPL-MCNC: 9 MG/DL — SIGNIFICANT CHANGE UP (ref 7–23)
CALCIUM SERPL-MCNC: 8.9 MG/DL — SIGNIFICANT CHANGE UP (ref 8.5–10.1)
CHLORIDE SERPL-SCNC: 106 MMOL/L — SIGNIFICANT CHANGE UP (ref 96–108)
CO2 SERPL-SCNC: 26 MMOL/L — SIGNIFICANT CHANGE UP (ref 22–31)
CREAT SERPL-MCNC: 1.92 MG/DL — HIGH (ref 0.5–1.3)
GLUCOSE SERPL-MCNC: 101 MG/DL — HIGH (ref 70–99)
HCT VFR BLD CALC: 42.7 % — SIGNIFICANT CHANGE UP (ref 39–50)
HGB BLD-MCNC: 14.9 G/DL — SIGNIFICANT CHANGE UP (ref 13–17)
MCHC RBC-ENTMCNC: 28.7 PG — SIGNIFICANT CHANGE UP (ref 27–34)
MCHC RBC-ENTMCNC: 34.9 GM/DL — SIGNIFICANT CHANGE UP (ref 32–36)
MCV RBC AUTO: 82.3 FL — SIGNIFICANT CHANGE UP (ref 80–100)
PLATELET # BLD AUTO: 336 K/UL — SIGNIFICANT CHANGE UP (ref 150–400)
POTASSIUM SERPL-MCNC: 3.8 MMOL/L — SIGNIFICANT CHANGE UP (ref 3.5–5.3)
POTASSIUM SERPL-SCNC: 3.8 MMOL/L — SIGNIFICANT CHANGE UP (ref 3.5–5.3)
PROT SERPL-MCNC: 7 GM/DL — SIGNIFICANT CHANGE UP (ref 6–8.3)
RBC # BLD: 5.19 M/UL — SIGNIFICANT CHANGE UP (ref 4.2–5.8)
RBC # FLD: 11.6 % — SIGNIFICANT CHANGE UP (ref 11–15)
SODIUM SERPL-SCNC: 140 MMOL/L — SIGNIFICANT CHANGE UP (ref 135–145)
VANCOMYCIN TROUGH SERPL-MCNC: 19.4 UG/ML — SIGNIFICANT CHANGE UP (ref 10–20)
VANCOMYCIN TROUGH SERPL-MCNC: >50 UG/ML — CRITICAL HIGH (ref 10–20)
WBC # BLD: 21.4 K/UL — HIGH (ref 3.8–10.5)
WBC # FLD AUTO: 21.4 K/UL — HIGH (ref 3.8–10.5)

## 2017-02-18 PROCEDURE — 99233 SBSQ HOSP IP/OBS HIGH 50: CPT | Mod: GC

## 2017-02-18 RX ORDER — VANCOMYCIN HCL 1 G
1000 VIAL (EA) INTRAVENOUS EVERY 8 HOURS
Qty: 0 | Refills: 0 | Status: DISCONTINUED | OUTPATIENT
Start: 2017-02-18 | End: 2017-02-18

## 2017-02-18 RX ORDER — VANCOMYCIN HCL 1 G
VIAL (EA) INTRAVENOUS
Qty: 0 | Refills: 0 | Status: DISCONTINUED | OUTPATIENT
Start: 2017-02-18 | End: 2017-02-19

## 2017-02-18 RX ORDER — VANCOMYCIN HCL 1 G
1000 VIAL (EA) INTRAVENOUS EVERY 12 HOURS
Qty: 0 | Refills: 0 | Status: DISCONTINUED | OUTPATIENT
Start: 2017-02-19 | End: 2017-02-19

## 2017-02-18 RX ORDER — VANCOMYCIN HCL 1 G
VIAL (EA) INTRAVENOUS
Qty: 0 | Refills: 0 | Status: DISCONTINUED | OUTPATIENT
Start: 2017-02-18 | End: 2017-02-18

## 2017-02-18 RX ORDER — SODIUM CHLORIDE 9 MG/ML
1000 INJECTION INTRAMUSCULAR; INTRAVENOUS; SUBCUTANEOUS
Qty: 0 | Refills: 0 | Status: DISCONTINUED | OUTPATIENT
Start: 2017-02-18 | End: 2017-02-21

## 2017-02-18 RX ORDER — VANCOMYCIN HCL 1 G
1000 VIAL (EA) INTRAVENOUS ONCE
Qty: 0 | Refills: 0 | Status: COMPLETED | OUTPATIENT
Start: 2017-02-18 | End: 2017-02-18

## 2017-02-18 RX ADMIN — Medication 250 MILLIGRAM(S): at 16:12

## 2017-02-18 RX ADMIN — ONDANSETRON 4 MILLIGRAM(S): 8 TABLET, FILM COATED ORAL at 06:16

## 2017-02-18 RX ADMIN — SODIUM CHLORIDE 125 MILLILITER(S): 9 INJECTION INTRAMUSCULAR; INTRAVENOUS; SUBCUTANEOUS at 12:36

## 2017-02-18 RX ADMIN — PIPERACILLIN AND TAZOBACTAM 25 GRAM(S): 4; .5 INJECTION, POWDER, LYOPHILIZED, FOR SOLUTION INTRAVENOUS at 13:06

## 2017-02-18 RX ADMIN — SODIUM CHLORIDE 125 MILLILITER(S): 9 INJECTION INTRAMUSCULAR; INTRAVENOUS; SUBCUTANEOUS at 22:10

## 2017-02-18 RX ADMIN — ONDANSETRON 4 MILLIGRAM(S): 8 TABLET, FILM COATED ORAL at 22:29

## 2017-02-18 RX ADMIN — MORPHINE SULFATE 2 MILLIGRAM(S): 50 CAPSULE, EXTENDED RELEASE ORAL at 11:04

## 2017-02-18 RX ADMIN — MORPHINE SULFATE 2 MILLIGRAM(S): 50 CAPSULE, EXTENDED RELEASE ORAL at 10:49

## 2017-02-18 RX ADMIN — MORPHINE SULFATE 2 MILLIGRAM(S): 50 CAPSULE, EXTENDED RELEASE ORAL at 22:45

## 2017-02-18 RX ADMIN — Medication 1 PATCH: at 13:06

## 2017-02-18 RX ADMIN — PIPERACILLIN AND TAZOBACTAM 25 GRAM(S): 4; .5 INJECTION, POWDER, LYOPHILIZED, FOR SOLUTION INTRAVENOUS at 22:09

## 2017-02-18 RX ADMIN — MORPHINE SULFATE 2 MILLIGRAM(S): 50 CAPSULE, EXTENDED RELEASE ORAL at 22:29

## 2017-02-18 RX ADMIN — Medication 250 MILLIGRAM(S): at 05:30

## 2017-02-18 RX ADMIN — Medication 1 PATCH: at 13:07

## 2017-02-18 RX ADMIN — PIPERACILLIN AND TAZOBACTAM 25 GRAM(S): 4; .5 INJECTION, POWDER, LYOPHILIZED, FOR SOLUTION INTRAVENOUS at 05:30

## 2017-02-18 NOTE — PROGRESS NOTE ADULT - ASSESSMENT
· Assessment		  32 y/o male with left groin cellulitis possible abscess. Afebrile considering I and D    Problem/Plan - 1:  ·  Problem: Cellulitis of groin, left.  Plan: Continues to have induration possible fluctuance appreciateID and surgical consult  -US left groin  -Continue  Zaosyn and Vanco   -Tylenol/Percocet prn fever pain.     Problem/Plan - 2:  ·  Problem: Seizure disorder.  Plan: Stable no seizure activity reported   levETIRAcetam 500milliGRAM(s) Oral two times a day.     Problem/Plan - 3:  ·  Problem: Leukocytosis, unspecified type.  Plan: improvement source of left shift is groin cellulitis.

## 2017-02-18 NOTE — PROGRESS NOTE ADULT - SUBJECTIVE AND OBJECTIVE BOX
General Surgery    Tmax 38.6   WBC up to 21,400  Still c/o severe L groin pain    Sono shows extensive inflamatory changes in left groin but no organized, drainable collection    PE:  3 x 5 cm area of marked swelling w erythema & marked tenderness in left groin w/o definite fluctuance    Plan:  Continue antibiotics per ID and continue to follow exam re possible I&D once lesion points adequately.

## 2017-02-18 NOTE — CHART NOTE - NSCHARTNOTEFT_GEN_A_CORE
Vanco trough noted and lab called to find out when it was drawn. Level drawn at about 7:30 after Vanco was administered. Will recheck Vanco trough STAT and resume if within normal limits. IVF started for elevated BUN/Cr and worsening leukocytosis.

## 2017-02-18 NOTE — PROGRESS NOTE ADULT - SUBJECTIVE AND OBJECTIVE BOX
Patient is a 31y old  Male who presents with a chief complaint of Pain and swelling of left groin today. (16 Feb 2017 03:10)      INTERVAL HPI/OVERNIGHT EVENTS: no acute events complains of redness at the left groin     MEDICATIONS  (STANDING):  piperacillin/tazobactam IVPB. 3.375Gram(s) IV Intermittent every 8 hours  levETIRAcetam 500milliGRAM(s) Oral two times a day  nicotine -  14 mG/24Hr(s) Patch 1patch Transdermal daily    MEDICATIONS  (PRN):  acetaminophen   Tablet. 650milliGRAM(s) Oral every 6 hours PRN Mild Pain (1 - 3)  morphine  - Injectable 2milliGRAM(s) IV Push every 6 hours PRN Severe Pain (7 - 10)  oxyCODONE  5 mG/acetaminophen 325 mG 2Tablet(s) Oral every 4 hours PRN Moderate Pain (4 - 6)  ondansetron Injectable 4milliGRAM(s) IV Push every 6 hours PRN Nausea and/or Vomiting  acetaminophen   Tablet 650milliGRAM(s) Oral every 6 hours PRN For Temp greater than 38 C (100.4 F)      Allergies    No Known Allergies    Intolerances        REVIEW OF SYSTEMS:  CONSTITUTIONAL: No fever, weight loss, or fatigue  EYES: No eye pain, visual disturbances, or discharge  ENMT:  No difficulty hearing, tinnitus, vertigo; No sinus or throat pain  NECK: No pain or stiffness  BREASTS: No pain, masses, or nipple discharge  RESPIRATORY: No cough, wheezing, chills or hemoptysis; No shortness of breath  CARDIOVASCULAR: No chest pain, palpitations, dizziness, or leg swelling  GASTROINTESTINAL: No abdominal or epigastric pain. No nausea, vomiting, or hematemesis; No diarrhea or constipation. No melena or hematochezia.  GENITOURINARY: No dysuria, frequency, hematuria, or incontinence  NEUROLOGICAL: No headaches, memory loss, loss of strength, numbness, or tremors  SKIN: No itching, burning, rashes, or lesions   LYMPH NODES: No enlarged glands  ENDOCRINE: No heat or cold intolerance; No hair loss  MUSCULOSKELETAL: No joint pain or swelling; No muscle, back, or extremity pain  PSYCHIATRIC: No depression, anxiety, mood swings, or difficulty sleeping  HEME/LYMPH: No easy bruising, or bleeding gums  ALLERGY AND IMMUNOLOGIC: No hives or eczema    Vital Signs Last 24 Hrs  T(C): 36.7, Max: 38.6 (02-17 @ 23:53)  T(F): 98.1, Max: 101.5 (02-17 @ 23:53)  HR: 75 (75 - 91)  BP: 108/74 (108/74 - 141/91)  BP(mean): --  RR: 16 (15 - 16)  SpO2: 98% (96% - 100%)    PHYSICAL EXAM:  GENERAL: NAD, well-groomed, well-developed  HEAD:  Atraumatic, Normocephalic  EYES: EOMI, PERRLA, conjunctiva and sclera clear  ENMT: No tonsillar erythema, exudates, or enlargement; Moist mucous membranes, Good dentition, No lesions  NECK: Supple, No JVD, Normal thyroid  NERVOUS SYSTEM:  Alert & Oriented X3, Good concentration; Motor Strength 5/5 B/L upper and lower extremities; DTRs 2+ intact and symmetric  CHEST/LUNG: Clear to percussion bilaterally; No rales, rhonchi, wheezing, or rubs  HEART: Regular rate and rhythm; No murmurs, rubs, or gallops  ABDOMEN: Soft, Nontender, Nondistended; Bowel sounds present  EXTREMITIES:  2+ Peripheral Pulses, No clubbing, cyanosis, or edema  LYMPH: No lymphadenopathy noted  SKIN: No rashes or lesions    LABS:                        14.9   21.4  )-----------( 336      ( 18 Feb 2017 07:35 )             42.7     18 Feb 2017 07:35    140    |  106    |  9      ----------------------------<  101    3.8     |  26     |  1.92     Ca    8.9        18 Feb 2017 07:35    TPro  7.0    /  Alb  3.2    /  TBili  1.0    /  DBili  x      /  AST  32     /  ALT  64     /  AlkPhos  97     18 Feb 2017 07:35        CAPILLARY BLOOD GLUCOSE      RADIOLOGY & ADDITIONAL TESTS:    Imaging Personally Reviewed:  [x ] YES  [ ] NO    Consultant(s) Notes Reviewed:  [x ] YES  [ ] NO    Care Discussed with Consultants/Other Providers [x ] YES  [ ] NO

## 2017-02-19 DIAGNOSIS — N17.9 ACUTE KIDNEY FAILURE, UNSPECIFIED: ICD-10-CM

## 2017-02-19 LAB
ALBUMIN SERPL ELPH-MCNC: 2.8 G/DL — LOW (ref 3.3–5)
ALP SERPL-CCNC: 76 U/L — SIGNIFICANT CHANGE UP (ref 40–120)
ALT FLD-CCNC: 44 U/L — SIGNIFICANT CHANGE UP (ref 12–78)
ANION GAP SERPL CALC-SCNC: 8 MMOL/L — SIGNIFICANT CHANGE UP (ref 5–17)
AST SERPL-CCNC: 15 U/L — SIGNIFICANT CHANGE UP (ref 15–37)
BILIRUB SERPL-MCNC: 0.8 MG/DL — SIGNIFICANT CHANGE UP (ref 0.2–1.2)
BUN SERPL-MCNC: 11 MG/DL — SIGNIFICANT CHANGE UP (ref 7–23)
CALCIUM SERPL-MCNC: 8.3 MG/DL — LOW (ref 8.5–10.1)
CHLORIDE SERPL-SCNC: 106 MMOL/L — SIGNIFICANT CHANGE UP (ref 96–108)
CO2 SERPL-SCNC: 26 MMOL/L — SIGNIFICANT CHANGE UP (ref 22–31)
CREAT SERPL-MCNC: 2.79 MG/DL — HIGH (ref 0.5–1.3)
GLUCOSE SERPL-MCNC: 91 MG/DL — SIGNIFICANT CHANGE UP (ref 70–99)
HCT VFR BLD CALC: 39 % — SIGNIFICANT CHANGE UP (ref 39–50)
HGB BLD-MCNC: 13.6 G/DL — SIGNIFICANT CHANGE UP (ref 13–17)
MCHC RBC-ENTMCNC: 28.3 PG — SIGNIFICANT CHANGE UP (ref 27–34)
MCHC RBC-ENTMCNC: 34.9 GM/DL — SIGNIFICANT CHANGE UP (ref 32–36)
MCV RBC AUTO: 81 FL — SIGNIFICANT CHANGE UP (ref 80–100)
PLATELET # BLD AUTO: 316 K/UL — SIGNIFICANT CHANGE UP (ref 150–400)
POTASSIUM SERPL-MCNC: 3.6 MMOL/L — SIGNIFICANT CHANGE UP (ref 3.5–5.3)
POTASSIUM SERPL-SCNC: 3.6 MMOL/L — SIGNIFICANT CHANGE UP (ref 3.5–5.3)
PROT SERPL-MCNC: 6 GM/DL — SIGNIFICANT CHANGE UP (ref 6–8.3)
RBC # BLD: 4.81 M/UL — SIGNIFICANT CHANGE UP (ref 4.2–5.8)
RBC # FLD: 11.2 % — SIGNIFICANT CHANGE UP (ref 11–15)
SODIUM SERPL-SCNC: 140 MMOL/L — SIGNIFICANT CHANGE UP (ref 135–145)
VANCOMYCIN TROUGH SERPL-MCNC: 32.9 UG/ML — CRITICAL HIGH (ref 10–20)
WBC # BLD: 17.5 K/UL — HIGH (ref 3.8–10.5)
WBC # FLD AUTO: 17.5 K/UL — HIGH (ref 3.8–10.5)

## 2017-02-19 PROCEDURE — 99233 SBSQ HOSP IP/OBS HIGH 50: CPT

## 2017-02-19 RX ORDER — MORPHINE SULFATE 50 MG/1
2 CAPSULE, EXTENDED RELEASE ORAL EVERY 4 HOURS
Qty: 0 | Refills: 0 | Status: DISCONTINUED | OUTPATIENT
Start: 2017-02-19 | End: 2017-02-21

## 2017-02-19 RX ADMIN — Medication 1 PATCH: at 11:49

## 2017-02-19 RX ADMIN — ONDANSETRON 4 MILLIGRAM(S): 8 TABLET, FILM COATED ORAL at 11:47

## 2017-02-19 RX ADMIN — MORPHINE SULFATE 2 MILLIGRAM(S): 50 CAPSULE, EXTENDED RELEASE ORAL at 17:54

## 2017-02-19 RX ADMIN — MORPHINE SULFATE 2 MILLIGRAM(S): 50 CAPSULE, EXTENDED RELEASE ORAL at 22:33

## 2017-02-19 RX ADMIN — MORPHINE SULFATE 2 MILLIGRAM(S): 50 CAPSULE, EXTENDED RELEASE ORAL at 11:48

## 2017-02-19 RX ADMIN — SODIUM CHLORIDE 125 MILLILITER(S): 9 INJECTION INTRAMUSCULAR; INTRAVENOUS; SUBCUTANEOUS at 11:47

## 2017-02-19 RX ADMIN — PIPERACILLIN AND TAZOBACTAM 25 GRAM(S): 4; .5 INJECTION, POWDER, LYOPHILIZED, FOR SOLUTION INTRAVENOUS at 13:35

## 2017-02-19 RX ADMIN — Medication 1 TABLET(S): at 12:02

## 2017-02-19 RX ADMIN — MORPHINE SULFATE 2 MILLIGRAM(S): 50 CAPSULE, EXTENDED RELEASE ORAL at 12:13

## 2017-02-19 RX ADMIN — MORPHINE SULFATE 2 MILLIGRAM(S): 50 CAPSULE, EXTENDED RELEASE ORAL at 21:56

## 2017-02-19 RX ADMIN — MORPHINE SULFATE 2 MILLIGRAM(S): 50 CAPSULE, EXTENDED RELEASE ORAL at 18:10

## 2017-02-19 RX ADMIN — PIPERACILLIN AND TAZOBACTAM 25 GRAM(S): 4; .5 INJECTION, POWDER, LYOPHILIZED, FOR SOLUTION INTRAVENOUS at 21:29

## 2017-02-19 RX ADMIN — Medication 250 MILLIGRAM(S): at 06:08

## 2017-02-19 RX ADMIN — PIPERACILLIN AND TAZOBACTAM 25 GRAM(S): 4; .5 INJECTION, POWDER, LYOPHILIZED, FOR SOLUTION INTRAVENOUS at 06:08

## 2017-02-19 NOTE — PROCEDURE NOTE - NSPOSTCAREGUIDE_GEN_A_CORE
Verbal/written post procedure instructions were given to patient/caregiver/Keep the cast/splint/dressing clean and dry/Instructed patient/caregiver regarding signs and symptoms of infection

## 2017-02-19 NOTE — PROGRESS NOTE ADULT - SUBJECTIVE AND OBJECTIVE BOX
Patient is a 31y old  Male who presents with a chief complaint of Pain and swelling of left groin today. (16 Feb 2017 03:10)      INTERVAL HPI / OVERNIGHT EVENTS:s/p I and D few hrs ago    MEDICATIONS  (STANDING):  piperacillin/tazobactam IVPB. 3.375Gram(s) IV Intermittent every 8 hours  levETIRAcetam 500milliGRAM(s) Oral two times a day  nicotine -  14 mG/24Hr(s) Patch 1patch Transdermal daily  sodium chloride 0.9%. 1000milliLiter(s) IV Continuous <Continuous>    MEDICATIONS  (PRN):  acetaminophen   Tablet. 650milliGRAM(s) Oral every 6 hours PRN Mild Pain (1 - 3)  oxyCODONE  5 mG/acetaminophen 325 mG 2Tablet(s) Oral every 4 hours PRN Moderate Pain (4 - 6)  ondansetron Injectable 4milliGRAM(s) IV Push every 6 hours PRN Nausea and/or Vomiting  acetaminophen   Tablet 650milliGRAM(s) Oral every 6 hours PRN For Temp greater than 38 C (100.4 F)  morphine  - Injectable 2milliGRAM(s) IV Push every 4 hours PRN Severe Pain (7 - 10)      Vital Signs Last 24 Hrs  T(C): 36.6, Max: 37.3 (02-19 @ 05:57)  T(F): 97.8, Max: 99.1 (02-19 @ 05:57)  HR: 80 (75 - 97)  BP: 119/84 (115/66 - 127/82)  BP(mean): --  RR: 18 (14 - 18)  SpO2: 99% (97% - 99%)    PHYSICAL EXAM:    Constitutional: NAD, well-groomed, well-developed  Respiratory: CTAB/L  Cardiovascular: S1 and S2, RRR, no M/G/R  Gastrointestinal: BS+, soft, NT/ND  Extremities: No peripheral edema  Vascular: 2+ peripheral pulses  Skin:s/p I an d D today with dressing+      LABS:                        13.6   17.5  )-----------( 316      ( 19 Feb 2017 07:29 )             39.0     19 Feb 2017 07:29    140    |  106    |  11     ----------------------------<  91     3.6     |  26     |  2.79     Ca    8.3        19 Feb 2017 07:29    TPro  6.0    /  Alb  2.8    /  TBili  0.8    /  DBili  x      /  AST  15     /  ALT  44     /  AlkPhos  76     19 Feb 2017 07:29            MICROBIOLOGY:  RECENT CULTURES:  02-16 .Blood Blood XXXX XXXX   No growth to date.          RADIOLOGY & ADDITIONAL STUDIES:

## 2017-02-19 NOTE — PROGRESS NOTE ADULT - SUBJECTIVE AND OBJECTIVE BOX
Patient seen and examined at bedside in no distress. Reports localized L groin pain, worsening. Denies fever, chills, abdominal pain, chest pain, sob.     Vital Signs Last 24 Hrs  T(F): 99.1, Max: 99.1 (02-19 @ 05:57)  HR: 83  BP: 115/66  RR: 15  SpO2: 98%    GENERAL: Alert, oriented, NAD  CHEST/LUNG: Respirations nonlabored  HEART: S1S2, Regular rate and rhythm  GROIN: Left groin with localized inflammatory mass and erythema; markedly tender to palpation. External genitalia WNL.    EXTREMITIES:  no calf tenderness, no edema b/l    AM labs pending    Impression: 31M a/w cellulitis of left groin and secondary leukocytosis, now with JAMES    Plan:  - continue with antibiotics  - continue with IVF  - monitor wound for fluctuance  - f/u AM labs  - continue with all medical management  - will d/w surgical attending

## 2017-02-19 NOTE — PROGRESS NOTE ADULT - SUBJECTIVE AND OBJECTIVE BOX
Patient is a 31y old  Male who presents with a chief complaint of Pain and swelling of left groin today. (16 Feb 2017 03:10)      INTERVAL HPI/OVERNIGHT EVENTS: nausea nad vomiting bactrim added vanco hold     MEDICATIONS  (STANDING):  piperacillin/tazobactam IVPB. 3.375Gram(s) IV Intermittent every 8 hours  levETIRAcetam 500milliGRAM(s) Oral two times a day  nicotine -  14 mG/24Hr(s) Patch 1patch Transdermal daily  sodium chloride 0.9%. 1000milliLiter(s) IV Continuous <Continuous>  trimethoprim  160 mG/sulfamethoxazole 800 mG 1Tablet(s) Oral daily    MEDICATIONS  (PRN):  acetaminophen   Tablet. 650milliGRAM(s) Oral every 6 hours PRN Mild Pain (1 - 3)  morphine  - Injectable 2milliGRAM(s) IV Push every 6 hours PRN Severe Pain (7 - 10)  oxyCODONE  5 mG/acetaminophen 325 mG 2Tablet(s) Oral every 4 hours PRN Moderate Pain (4 - 6)  ondansetron Injectable 4milliGRAM(s) IV Push every 6 hours PRN Nausea and/or Vomiting  acetaminophen   Tablet 650milliGRAM(s) Oral every 6 hours PRN For Temp greater than 38 C (100.4 F)      Allergies    No Known Allergies    Intolerances        REVIEW OF SYSTEMS:  CONSTITUTIONAL: No fever, weight loss, or fatigue  EYES: No eye pain, visual disturbances, or discharge  ENMT:  No difficulty hearing, tinnitus, vertigo; No sinus or throat pain  NECK: No pain or stiffness  BREASTS: No pain, masses, or nipple discharge  RESPIRATORY: No cough, wheezing, chills or hemoptysis; No shortness of breath  CARDIOVASCULAR: No chest pain, palpitations, dizziness, or leg swelling  GASTROINTESTINAL: nausea and vomiting   GENITOURINARY: No dysuria, frequency, hematuria, or incontinence  NEUROLOGICAL: No headaches, memory loss, loss of strength, numbness, or tremors  SKIN: No itching, burning, rashes, or lesions   LYMPH NODES: No enlarged glands  ENDOCRINE: No heat or cold intolerance; No hair loss  MUSCULOSKELETAL: No joint pain or swelling; No muscle, back, or extremity pain  PSYCHIATRIC: No depression, anxiety, mood swings, or difficulty sleeping  HEME/LYMPH: No easy bruising, or bleeding gums  ALLERGY AND IMMUNOLOGIC: No hives or eczema    Vital Signs Last 24 Hrs  T(C): 36.3, Max: 37.3 (02-19 @ 05:57)  T(F): 97.4, Max: 99.1 (02-19 @ 05:57)  HR: 75 (75 - 97)  BP: 116/76 (115/66 - 138/71)  BP(mean): --  RR: 14 (14 - 18)  SpO2: 99% (97% - 986%)    PHYSICAL EXAM:  GENERAL: NAD, well-groomed, well-developed  HEAD:  Atraumatic, Normocephalic  EYES: EOMI, PERRLA, conjunctiva and sclera clear  ENMT: No tonsillar erythema, exudates, or enlargement; Moist mucous membranes, Good dentition, No lesions  NECK: Supple, No JVD, Normal thyroid  NERVOUS SYSTEM:  Alert & Oriented X3, Good concentration; Motor Strength 5/5 B/L upper and lower extremities; DTRs 2+ intact and symmetric  CHEST/LUNG: Clear to percussion bilaterally; No rales, rhonchi, wheezing, or rubs  HEART: Regular rate and rhythm; No murmurs, rubs, or gallops  ABDOMEN: left groin mass   EXTREMITIES:  2+ Peripheral Pulses, No clubbing, cyanosis, or edema  LYMPH: No lymphadenopathy noted  SKIN: No rashes or lesions    LABS:                        13.6   17.5  )-----------( 316      ( 19 Feb 2017 07:29 )             39.0     19 Feb 2017 07:29    140    |  106    |  11     ----------------------------<  91     3.6     |  26     |  2.79     Ca    8.3        19 Feb 2017 07:29    TPro  6.0    /  Alb  2.8    /  TBili  0.8    /  DBili  x      /  AST  15     /  ALT  44     /  AlkPhos  76     19 Feb 2017 07:29        CAPILLARY BLOOD GLUCOSE      RADIOLOGY & ADDITIONAL TESTS:    Imaging Personally Reviewed:  [ ] YES  [ ] NO    Consultant(s) Notes Reviewed:  [ ] YES  [ ] NO    Care Discussed with Consultants/Other Providers [ ] YES  [ ] NO

## 2017-02-20 LAB
ALBUMIN SERPL ELPH-MCNC: 2.5 G/DL — LOW (ref 3.3–5)
ALP SERPL-CCNC: 69 U/L — SIGNIFICANT CHANGE UP (ref 40–120)
ALT FLD-CCNC: 26 U/L — SIGNIFICANT CHANGE UP (ref 12–78)
ANION GAP SERPL CALC-SCNC: 10 MMOL/L — SIGNIFICANT CHANGE UP (ref 5–17)
APPEARANCE UR: CLEAR — SIGNIFICANT CHANGE UP
AST SERPL-CCNC: 17 U/L — SIGNIFICANT CHANGE UP (ref 15–37)
BACTERIA # UR AUTO: ABNORMAL
BILIRUB SERPL-MCNC: 0.8 MG/DL — SIGNIFICANT CHANGE UP (ref 0.2–1.2)
BILIRUB UR-MCNC: NEGATIVE — SIGNIFICANT CHANGE UP
BUN SERPL-MCNC: 13 MG/DL — SIGNIFICANT CHANGE UP (ref 7–23)
CALCIUM SERPL-MCNC: 7.9 MG/DL — LOW (ref 8.5–10.1)
CHLORIDE SERPL-SCNC: 108 MMOL/L — SIGNIFICANT CHANGE UP (ref 96–108)
CO2 SERPL-SCNC: 23 MMOL/L — SIGNIFICANT CHANGE UP (ref 22–31)
COLOR SPEC: YELLOW — SIGNIFICANT CHANGE UP
COMMENT - URINE: SIGNIFICANT CHANGE UP
CREAT SERPL-MCNC: 3.54 MG/DL — HIGH (ref 0.5–1.3)
DIFF PNL FLD: ABNORMAL
EPI CELLS # UR: SIGNIFICANT CHANGE UP
GLUCOSE SERPL-MCNC: 92 MG/DL — SIGNIFICANT CHANGE UP (ref 70–99)
GLUCOSE UR QL: NEGATIVE MG/DL — SIGNIFICANT CHANGE UP
HCT VFR BLD CALC: 37 % — LOW (ref 39–50)
HGB BLD-MCNC: 13 G/DL — SIGNIFICANT CHANGE UP (ref 13–17)
KETONES UR-MCNC: NEGATIVE — SIGNIFICANT CHANGE UP
LEUKOCYTE ESTERASE UR-ACNC: NEGATIVE — SIGNIFICANT CHANGE UP
MAGNESIUM SERPL-MCNC: 2.1 MG/DL — SIGNIFICANT CHANGE UP (ref 1.8–2.4)
MCHC RBC-ENTMCNC: 28.9 PG — SIGNIFICANT CHANGE UP (ref 27–34)
MCHC RBC-ENTMCNC: 35.3 GM/DL — SIGNIFICANT CHANGE UP (ref 32–36)
MCV RBC AUTO: 82.1 FL — SIGNIFICANT CHANGE UP (ref 80–100)
NITRITE UR-MCNC: NEGATIVE — SIGNIFICANT CHANGE UP
PH UR: 5 — SIGNIFICANT CHANGE UP (ref 4.8–8)
PHOSPHATE SERPL-MCNC: 3.8 MG/DL — SIGNIFICANT CHANGE UP (ref 2.5–4.5)
PLATELET # BLD AUTO: 267 K/UL — SIGNIFICANT CHANGE UP (ref 150–400)
POTASSIUM SERPL-MCNC: 3.5 MMOL/L — SIGNIFICANT CHANGE UP (ref 3.5–5.3)
POTASSIUM SERPL-SCNC: 3.5 MMOL/L — SIGNIFICANT CHANGE UP (ref 3.5–5.3)
PROT SERPL-MCNC: 5.7 GM/DL — LOW (ref 6–8.3)
PROT UR-MCNC: 30 MG/DL
RBC # BLD: 4.51 M/UL — SIGNIFICANT CHANGE UP (ref 4.2–5.8)
RBC # FLD: 11.3 % — SIGNIFICANT CHANGE UP (ref 11–15)
RBC CASTS # UR COMP ASSIST: ABNORMAL /HPF (ref 0–4)
SODIUM SERPL-SCNC: 141 MMOL/L — SIGNIFICANT CHANGE UP (ref 135–145)
SP GR SPEC: 1 — LOW (ref 1.01–1.02)
UROBILINOGEN FLD QL: NEGATIVE MG/DL — SIGNIFICANT CHANGE UP
WBC # BLD: 17.6 K/UL — HIGH (ref 3.8–10.5)
WBC # FLD AUTO: 17.6 K/UL — HIGH (ref 3.8–10.5)
WBC UR QL: >50

## 2017-02-20 PROCEDURE — 99233 SBSQ HOSP IP/OBS HIGH 50: CPT

## 2017-02-20 RX ORDER — MORPHINE SULFATE 50 MG/1
2 CAPSULE, EXTENDED RELEASE ORAL ONCE
Qty: 0 | Refills: 0 | Status: DISCONTINUED | OUTPATIENT
Start: 2017-02-20 | End: 2017-02-20

## 2017-02-20 RX ADMIN — PIPERACILLIN AND TAZOBACTAM 25 GRAM(S): 4; .5 INJECTION, POWDER, LYOPHILIZED, FOR SOLUTION INTRAVENOUS at 21:43

## 2017-02-20 RX ADMIN — MORPHINE SULFATE 2 MILLIGRAM(S): 50 CAPSULE, EXTENDED RELEASE ORAL at 19:46

## 2017-02-20 RX ADMIN — Medication 1 PATCH: at 12:26

## 2017-02-20 RX ADMIN — MORPHINE SULFATE 2 MILLIGRAM(S): 50 CAPSULE, EXTENDED RELEASE ORAL at 18:54

## 2017-02-20 RX ADMIN — MORPHINE SULFATE 2 MILLIGRAM(S): 50 CAPSULE, EXTENDED RELEASE ORAL at 11:02

## 2017-02-20 RX ADMIN — MORPHINE SULFATE 2 MILLIGRAM(S): 50 CAPSULE, EXTENDED RELEASE ORAL at 04:29

## 2017-02-20 RX ADMIN — MORPHINE SULFATE 2 MILLIGRAM(S): 50 CAPSULE, EXTENDED RELEASE ORAL at 09:29

## 2017-02-20 RX ADMIN — PIPERACILLIN AND TAZOBACTAM 25 GRAM(S): 4; .5 INJECTION, POWDER, LYOPHILIZED, FOR SOLUTION INTRAVENOUS at 05:07

## 2017-02-20 RX ADMIN — MORPHINE SULFATE 2 MILLIGRAM(S): 50 CAPSULE, EXTENDED RELEASE ORAL at 05:09

## 2017-02-20 RX ADMIN — Medication 1 PATCH: at 12:24

## 2017-02-20 RX ADMIN — PIPERACILLIN AND TAZOBACTAM 25 GRAM(S): 4; .5 INJECTION, POWDER, LYOPHILIZED, FOR SOLUTION INTRAVENOUS at 13:40

## 2017-02-20 RX ADMIN — SODIUM CHLORIDE 125 MILLILITER(S): 9 INJECTION INTRAMUSCULAR; INTRAVENOUS; SUBCUTANEOUS at 21:44

## 2017-02-20 RX ADMIN — MORPHINE SULFATE 2 MILLIGRAM(S): 50 CAPSULE, EXTENDED RELEASE ORAL at 20:33

## 2017-02-20 RX ADMIN — SODIUM CHLORIDE 125 MILLILITER(S): 9 INJECTION INTRAMUSCULAR; INTRAVENOUS; SUBCUTANEOUS at 05:07

## 2017-02-20 RX ADMIN — ONDANSETRON 4 MILLIGRAM(S): 8 TABLET, FILM COATED ORAL at 17:09

## 2017-02-20 RX ADMIN — MORPHINE SULFATE 2 MILLIGRAM(S): 50 CAPSULE, EXTENDED RELEASE ORAL at 20:48

## 2017-02-20 RX ADMIN — LEVETIRACETAM 500 MILLIGRAM(S): 250 TABLET, FILM COATED ORAL at 05:08

## 2017-02-20 RX ADMIN — ONDANSETRON 4 MILLIGRAM(S): 8 TABLET, FILM COATED ORAL at 23:32

## 2017-02-20 RX ADMIN — SODIUM CHLORIDE 125 MILLILITER(S): 9 INJECTION INTRAMUSCULAR; INTRAVENOUS; SUBCUTANEOUS at 13:40

## 2017-02-20 NOTE — PROGRESS NOTE ADULT - SUBJECTIVE AND OBJECTIVE BOX
Patient is a 31y old  Male who presents with a chief complaint of Pain and swelling of left groin today. (16 Feb 2017 03:10)      INTERVAL HPI/OVERNIGHT EVENTS: creatnine continues to rise I and D performed by surgery     MEDICATIONS  (STANDING):  piperacillin/tazobactam IVPB. 3.375Gram(s) IV Intermittent every 8 hours  levETIRAcetam 500milliGRAM(s) Oral two times a day  nicotine -  14 mG/24Hr(s) Patch 1patch Transdermal daily  sodium chloride 0.9%. 1000milliLiter(s) IV Continuous <Continuous>    MEDICATIONS  (PRN):  acetaminophen   Tablet. 650milliGRAM(s) Oral every 6 hours PRN Mild Pain (1 - 3)  oxyCODONE  5 mG/acetaminophen 325 mG 2Tablet(s) Oral every 4 hours PRN Moderate Pain (4 - 6)  ondansetron Injectable 4milliGRAM(s) IV Push every 6 hours PRN Nausea and/or Vomiting  acetaminophen   Tablet 650milliGRAM(s) Oral every 6 hours PRN For Temp greater than 38 C (100.4 F)  morphine  - Injectable 2milliGRAM(s) IV Push every 4 hours PRN Severe Pain (7 - 10)      Allergies    No Known Allergies    Intolerances        REVIEW OF SYSTEMS:  CONSTITUTIONAL: No fever, weight loss, or fatigue  EYES: No eye pain, visual disturbances, or discharge  ENMT:  No difficulty hearing, tinnitus, vertigo; No sinus or throat pain  NECK: No pain or stiffness  BREASTS: No pain, masses, or nipple discharge  RESPIRATORY: No cough, wheezing, chills or hemoptysis; No shortness of breath  CARDIOVASCULAR: No chest pain, palpitations, dizziness, or leg swelling  GASTROINTESTINAL: No abdominal or epigastric pain. No nausea, vomiting, or hematemesis; No diarrhea or constipation. No melena or hematochezia.  GENITOURINARY: No dysuria, frequency, hematuria, or incontinence  NEUROLOGICAL: No headaches, memory loss, loss of strength, numbness, or tremors  SKIN: No itching, burning, rashes, or lesions   LYMPH NODES: No enlarged glands  ENDOCRINE: No heat or cold intolerance; No hair loss  MUSCULOSKELETAL:back and groin pain   PSYCHIATRIC: No depression, anxiety, mood swings, or difficulty sleeping  HEME/LYMPH: No easy bruising, or bleeding gums  ALLERGY AND IMMUNOLOGIC: No hives or eczema    Vital Signs Last 24 Hrs  T(C): 36.4, Max: 38.1 (02-20 @ 00:00)  T(F): 97.5, Max: 100.6 (02-20 @ 00:00)  HR: 72 (72 - 85)  BP: 129/84 (116/76 - 129/84)  BP(mean): --  RR: 16 (14 - 18)  SpO2: 97% (97% - 99%)    PHYSICAL EXAM:  GENERAL: NAD, well-groomed, well-developed  HEAD:  Atraumatic, Normocephalic  EYES: EOMI, PERRLA, conjunctiva and sclera clear  ENMT: No tonsillar erythema, exudates, or enlargement; Moist mucous membranes, Good dentition, No lesions  NECK: Supple, No JVD, Normal thyroid  NERVOUS SYSTEM:  Alert & Oriented X3, Good concentration; Motor Strength 5/5 B/L upper and lower extremities; DTRs 2+ intact and symmetric  CHEST/LUNG: Clear to percussion bilaterally; No rales, rhonchi, wheezing, or rubs  HEART: Regular rate and rhythm; No murmurs, rubs, or gallops  ABDOMEN: groin dressed   EXTREMITIES:  2+ Peripheral Pulses, No clubbing, cyanosis, or edema  LYMPH: No lymphadenopathy noted  SKIN: No rashes or lesions    LABS:                        13.0   17.6  )-----------( 267      ( 20 Feb 2017 07:39 )             37.0     20 Feb 2017 07:39    141    |  108    |  13     ----------------------------<  92     3.5     |  23     |  3.54     Ca    7.9        20 Feb 2017 07:39  Phos  3.8       20 Feb 2017 07:39  Mg     2.1       20 Feb 2017 07:39    TPro  5.7    /  Alb  2.5    /  TBili  0.8    /  DBili  x      /  AST  17     /  ALT  26     /  AlkPhos  69     20 Feb 2017 07:39        CAPILLARY BLOOD GLUCOSE      RADIOLOGY & ADDITIONAL TESTS:    Imaging Personally Reviewed:  [X ] YES  [ ] NO    Consultant(s) Notes Reviewed:  [X ] YES  [ ] NO    Care Discussed with Consultants/Other Providers [X ] YES  [ ] NO

## 2017-02-20 NOTE — PROGRESS NOTE ADULT - PROBLEM SELECTOR PLAN 1
s/p I & D of abscess  F/U on c/s  cont zosyn  CBC in am   D/C bactrim(vanco level high so covered for MRSA)  vanco d/carleen sec to ATN   cont vanco and ozysn  f/u on sonogram -if positive for fluid collection -will be drained by surgery
Continues to have induration possible fluctuance appreciateID and surgical consult  -US left groin  -Continue  Zaosyn and Vanco   -Tylenol/Percocet prn fever pain
increasing in severity   -renal consult   -IVF   hold all neprhotoxic agents
will continue to hold vanco slight improvement today continues to not have a drainable abscess  -continue zosyn and bactrim
R/o abscess,no improvmenet in swelling and pain  cont vanco and ozysn  f/u on sonogram -if positive for fluid collection -will be drained by surgery

## 2017-02-20 NOTE — PROGRESS NOTE ADULT - ASSESSMENT
32 y/o male s/p I and D of left groin abscess now with acute kidney injury secondary to vancomycin. all vanco stopped patient on IVF at 125cc /hr on zosyn for antibiotic coverage   await speciation nephrology consult requested

## 2017-02-20 NOTE — PROGRESS NOTE ADULT - PROBLEM SELECTOR PROBLEM 1
Cellulitis of groin, left
JAMES (acute kidney injury)

## 2017-02-20 NOTE — PROGRESS NOTE ADULT - PROBLEM SELECTOR PLAN 3
ATN sec to vanco likely  Cr trending up   will follow creatinine
continue to hold  vanco iv hydration
improvement source of left shift is groin cellulitis
stable no seizures

## 2017-02-20 NOTE — CONSULT NOTE ADULT - SUBJECTIVE AND OBJECTIVE BOX
Patient is a 31y old  Male who presents with a chief complaint of Pain and swelling of left groin today. (16 Feb 2017 03:10)    HPI:  32 y/o man with no PMH( possible seizure disorder) now p/w inguinal pain x2 days. Patient states he noticed some bulging over left inguinal area day before, like pimple,  today area became increasingly swollen, red, and painful with mild chills today, did not have subjective fever the day before. No previous injury, no history frequent infections, no sick contacts or travel.  No testicular pain or dysuria or frequency.  Denies n/v/d, denies dysuria, hematuria, frequency, urgency. (16 Feb 2017 03:10)    Noted JAMES, with associated elevated Vanco levels.    Patient post drainage and culture    PAST MEDICAL & SURGICAL HISTORY:  No pertinent past medical history  No significant past surgical history    FAMILY HISTORY:  Family history of essential hypertension (Mother)    No Known Allergies    MEDICATIONS  (STANDING):  piperacillin/tazobactam IVPB. 3.375Gram(s) IV Intermittent every 8 hours  levETIRAcetam 500milliGRAM(s) Oral two times a day  nicotine -  14 mG/24Hr(s) Patch 1patch Transdermal daily  sodium chloride 0.9%. 1000milliLiter(s) IV Continuous <Continuous>    MEDICATIONS  (PRN):  acetaminophen   Tablet. 650milliGRAM(s) Oral every 6 hours PRN Mild Pain (1 - 3)  oxyCODONE  5 mG/acetaminophen 325 mG 2Tablet(s) Oral every 4 hours PRN Moderate Pain (4 - 6)  ondansetron Injectable 4milliGRAM(s) IV Push every 6 hours PRN Nausea and/or Vomiting  acetaminophen   Tablet 650milliGRAM(s) Oral every 6 hours PRN For Temp greater than 38 C (100.4 F)  morphine  - Injectable 2milliGRAM(s) IV Push every 4 hours PRN Severe Pain (7 - 10)    Vital Signs Last 24 Hrs  T(C): 36.6, Max: 38.1 (02-20 @ 00:00)  T(F): 97.9, Max: 100.6 (02-20 @ 00:00)  HR: 72 (72 - 85)  BP: 114/75 (114/75 - 129/84)  BP(mean): --  RR: 16 (15 - 18)  SpO2: 99% (97% - 99%)    CAPILLARY BLOOD GLUCOSE    PHYSICAL EXAM:      T(C): 36.6, Max: 38.1 (02-20 @ 00:00)  HR: 72 (72 - 85)  BP: 114/75 (114/75 - 129/84)  RR: 16 (15 - 18)  SpO2: 99% (97% - 99%)  Wt(kg): --  Respiratory: clear anteriorly, decreased BS at bases  Cardiovascular: S1 S2  Gastrointestinal: soft NT ND +BS  Extremities:  no  edema              20 Feb 2017 07:39    141    |  108    |  13     ----------------------------<  92     3.5     |  23     |  3.54     Ca    7.9        20 Feb 2017 07:39  Phos  3.8       20 Feb 2017 07:39  Mg     2.1       20 Feb 2017 07:39    TPro  5.7    /  Alb  2.5    /  TBili  0.8    /  DBili  x      /  AST  17     /  ALT  26     /  AlkPhos  69     20 Feb 2017 07:39                          13.0   17.6  )-----------( 267      ( 20 Feb 2017 07:39 )             37.0             Assessment and Plan    JAMES, suspected Vanco ATN vs infectious GN or abx AIN.  Hold Vanco  UA micro urine eos  C3 and anti dnase titer  Will follow.

## 2017-02-20 NOTE — CHART NOTE - NSCHARTNOTEFT_GEN_A_CORE
ocedure. S/P I&D left groin abscess yesterday. C&S results pending. Vanco discontinued yesterday due to rising Cr (likely Vanco-related) and switched to p.o. Bactrim but then swithched back to Zosyn by ID despite adequate drainage procedure. AB regimen to be adjusted based on C&S results. WBC 17K.    Patient reports less pain and tenderness. Dressing intact; to be changed later with exchange of packing.

## 2017-02-20 NOTE — PROGRESS NOTE ADULT - SUBJECTIVE AND OBJECTIVE BOX
Patient seen and examined bedside resting comfortably s/p I&D L groin abscess on 2/19.  C/o scrotal pain at I&D site. C/o "kidney"/back pain.   Denies N/V. Tolerating diet.   Voiding normally.   Denies fever, chills, chest pain or SOB.  Ambulating.     T(F): 97.9, Max: 100.6 (02-20 @ 00:00)  HR: 72 (72 - 85)  BP: 114/75 (114/75 - 129/84)  RR: 16 (15 - 18)  SpO2: 99% (97% - 99%)    PHYSICAL EXAM:  General: NAD, WDWN  Heart: +S1, S2. Regular rate and rhythm   Lungs: CTA b/l. Good inspiratory effort   Abdomen: non distended. +BS. soft. non tender  : Dressing and packing removed from L groin. Minimal erythema. +Induration around incision site. +Tenderness. Wound irrigated with NS. +Serosang drainage with minimal pus noted. Wound packed with 1/2 inch iodoform packing. Dressed with gauze and tegaderm. Patient tolerated well. No SP tenderness. +right CVA tenderness   Extremities: No LE edema. No calf tenderness     LABS:                        13.0   17.6  )-----------( 267      ( 20 Feb 2017 07:39 )             37.0      20 Feb 2017 07:39    141    |  108    |  13     ----------------------------<  92     3.5     |  23     |  3.54     Ca    7.9        20 Feb 2017 07:39  Phos  3.8       20 Feb 2017 07:39  Mg     2.1       20 Feb 2017 07:39    TPro  5.7    /  Alb  2.5    /  TBili  0.8    /  DBili  x      /  AST  17     /  ALT  26     /  AlkPhos  69     20 Feb 2017 07:39      I&O's Detail  I & Os for 24h ending 20 Feb 2017 07:00  =============================================  IN:    sodium chloride 0.9%.: 1375 ml    Oral Fluid: 240 ml    Solution: 200 ml    Total IN: 1815 ml  ---------------------------------------------  OUT:    Total OUT: 0 ml  ---------------------------------------------  Total NET: 1815 ml    I & Os for current day (as of 20 Feb 2017 14:23)  =============================================  IN:    Solution: 100 ml    Total IN: 100 ml  ---------------------------------------------  OUT:    Total OUT: 0 ml  ---------------------------------------------  Total NET: 100 ml      Impression: 32 y/o M with ?possible seizure d/o s/p bedside I&D of L groin abscess on 2/19. ATN 2/2 to Vanco. Leukocytosis (17.6). Creatinine trending upwards (3.54).     Plan:   -Continue daily wound care.  -Continue Zosyn per ID. F/u C&S result from wound.  -Trend WBC & renal function. F/u labs   -Continue pain management   -Was discussed with Dr. Sidhu , he agrees with above    [employer]     I have examined this patient. I agree with the above.  J.M.Behan, PA-C Patient seen and examined bedside resting comfortably s/p I&D L groin abscess on 2/19.  C/o groin pain at I&D site. C/o "kidney"/back pain.   Denies N/V. Tolerating diet.   Voiding normally.   Denies fever, chills, chest pain or SOB.  Ambulating.     T(F): 97.9, Max: 100.6 (02-20 @ 00:00)  HR: 72 (72 - 85)  BP: 114/75 (114/75 - 129/84)  RR: 16 (15 - 18)  SpO2: 99% (97% - 99%)    PHYSICAL EXAM:  General: NAD, WDWN  Heart: +S1, S2. Regular rate and rhythm   Lungs: CTA b/l. Good inspiratory effort   Abdomen: non distended. +BS. soft. non tender  : Dressing and packing removed from L groin. Minimal erythema. +Induration around incision site. +Tenderness. Wound irrigated with NS. +Serosang drainage with minimal pus noted. Wound packed with 1/2 inch iodoform packing. Dressed with gauze and tegaderm. Patient tolerated well. No SP tenderness. +right CVA tenderness   Extremities: No LE edema. No calf tenderness     LABS:                        13.0   17.6  )-----------( 267      ( 20 Feb 2017 07:39 )             37.0      20 Feb 2017 07:39    141    |  108    |  13     ----------------------------<  92     3.5     |  23     |  3.54     Ca    7.9        20 Feb 2017 07:39  Phos  3.8       20 Feb 2017 07:39  Mg     2.1       20 Feb 2017 07:39    TPro  5.7    /  Alb  2.5    /  TBili  0.8    /  DBili  x      /  AST  17     /  ALT  26     /  AlkPhos  69     20 Feb 2017 07:39      I&O's Detail  I & Os for 24h ending 20 Feb 2017 07:00  =============================================  IN:    sodium chloride 0.9%.: 1375 ml    Oral Fluid: 240 ml    Solution: 200 ml    Total IN: 1815 ml  ---------------------------------------------  OUT:    Total OUT: 0 ml  ---------------------------------------------  Total NET: 1815 ml    I & Os for current day (as of 20 Feb 2017 14:23)  =============================================  IN:    Solution: 100 ml    Total IN: 100 ml  ---------------------------------------------  OUT:    Total OUT: 0 ml  ---------------------------------------------  Total NET: 100 ml      Impression: 30 y/o M with ?possible seizure d/o s/p bedside I&D of L groin abscess on 2/19. ATN 2/2 to Vanco. Leukocytosis (17.6). Creatinine trending upwards (3.54).     Plan:   -Continue daily wound care.  -Continue Zosyn per ID. F/u C&S result from wound.  -Trend WBC & renal function. F/u labs   -Continue pain management   -Was discussed with Dr. Sidhu , he agrees with above    [employer] [facility]     I have examined this patient. I agree with the above.  J.M.Behan, PA-C

## 2017-02-20 NOTE — PROGRESS NOTE ADULT - PROBLEM SELECTOR PROBLEM 2
Leukocytosis, unspecified type
Leukocytosis, unspecified type
Cellulitis of groin, left
Leukocytosis, unspecified type
Seizure disorder

## 2017-02-21 VITALS
OXYGEN SATURATION: 99 % | DIASTOLIC BLOOD PRESSURE: 82 MMHG | TEMPERATURE: 98 F | RESPIRATION RATE: 16 BRPM | HEART RATE: 77 BPM | SYSTOLIC BLOOD PRESSURE: 137 MMHG

## 2017-02-21 LAB
-  AMPICILLIN/SULBACTAM: SIGNIFICANT CHANGE UP
-  CEFAZOLIN: SIGNIFICANT CHANGE UP
-  CIPROFLOXACIN: SIGNIFICANT CHANGE UP
-  CLINDAMYCIN: SIGNIFICANT CHANGE UP
-  ERYTHROMYCIN: SIGNIFICANT CHANGE UP
-  GENTAMICIN: SIGNIFICANT CHANGE UP
-  LEVOFLOXACIN: SIGNIFICANT CHANGE UP
-  MOXIFLOXACIN(AEROBIC): SIGNIFICANT CHANGE UP
-  OXACILLIN: SIGNIFICANT CHANGE UP
-  PENICILLIN: SIGNIFICANT CHANGE UP
-  RIFAMPIN: SIGNIFICANT CHANGE UP
-  TETRACYCLINE: SIGNIFICANT CHANGE UP
-  TRIMETHOPRIM/SULFAMETHOXAZOLE: SIGNIFICANT CHANGE UP
-  VANCOMYCIN: SIGNIFICANT CHANGE UP
ALBUMIN SERPL ELPH-MCNC: 2.3 G/DL — LOW (ref 3.3–5)
ALP SERPL-CCNC: 60 U/L — SIGNIFICANT CHANGE UP (ref 40–120)
ALT FLD-CCNC: 26 U/L — SIGNIFICANT CHANGE UP (ref 12–78)
ANION GAP SERPL CALC-SCNC: 8 MMOL/L — SIGNIFICANT CHANGE UP (ref 5–17)
AST SERPL-CCNC: 11 U/L — LOW (ref 15–37)
BILIRUB SERPL-MCNC: 0.6 MG/DL — SIGNIFICANT CHANGE UP (ref 0.2–1.2)
BUN SERPL-MCNC: 12 MG/DL — SIGNIFICANT CHANGE UP (ref 7–23)
C3 SERPL-MCNC: 131 MG/DL — SIGNIFICANT CHANGE UP (ref 80–180)
CALCIUM SERPL-MCNC: 8 MG/DL — LOW (ref 8.5–10.1)
CHLORIDE SERPL-SCNC: 113 MMOL/L — HIGH (ref 96–108)
CO2 SERPL-SCNC: 23 MMOL/L — SIGNIFICANT CHANGE UP (ref 22–31)
CREAT SERPL-MCNC: 3.68 MG/DL — HIGH (ref 0.5–1.3)
CULTURE RESULTS: SIGNIFICANT CHANGE UP
EOSINOPHIL NFR URNS MANUAL: NEGATIVE — SIGNIFICANT CHANGE UP
GLUCOSE SERPL-MCNC: 87 MG/DL — SIGNIFICANT CHANGE UP (ref 70–99)
HCT VFR BLD CALC: 34.4 % — LOW (ref 39–50)
HGB BLD-MCNC: 11.9 G/DL — LOW (ref 13–17)
MCHC RBC-ENTMCNC: 27.9 PG — SIGNIFICANT CHANGE UP (ref 27–34)
MCHC RBC-ENTMCNC: 34.7 GM/DL — SIGNIFICANT CHANGE UP (ref 32–36)
MCV RBC AUTO: 80.4 FL — SIGNIFICANT CHANGE UP (ref 80–100)
METHOD TYPE: SIGNIFICANT CHANGE UP
ORGANISM # SPEC MICROSCOPIC CNT: SIGNIFICANT CHANGE UP
ORGANISM # SPEC MICROSCOPIC CNT: SIGNIFICANT CHANGE UP
PLATELET # BLD AUTO: 281 K/UL — SIGNIFICANT CHANGE UP (ref 150–400)
POTASSIUM SERPL-MCNC: 3.7 MMOL/L — SIGNIFICANT CHANGE UP (ref 3.5–5.3)
POTASSIUM SERPL-SCNC: 3.7 MMOL/L — SIGNIFICANT CHANGE UP (ref 3.5–5.3)
PROT SERPL-MCNC: 5.6 GM/DL — LOW (ref 6–8.3)
RBC # BLD: 4.27 M/UL — SIGNIFICANT CHANGE UP (ref 4.2–5.8)
RBC # FLD: 11 % — SIGNIFICANT CHANGE UP (ref 11–15)
SODIUM SERPL-SCNC: 144 MMOL/L — SIGNIFICANT CHANGE UP (ref 135–145)
SPECIMEN SOURCE: SIGNIFICANT CHANGE UP
WBC # BLD: 16.1 K/UL — HIGH (ref 3.8–10.5)
WBC # FLD AUTO: 16.1 K/UL — HIGH (ref 3.8–10.5)

## 2017-02-21 PROCEDURE — 99239 HOSP IP/OBS DSCHRG MGMT >30: CPT

## 2017-02-21 RX ORDER — ACETAMINOPHEN 500 MG
2 TABLET ORAL
Qty: 0 | Refills: 0 | COMMUNITY
Start: 2017-02-21

## 2017-02-21 RX ORDER — IBUPROFEN 200 MG
1 TABLET ORAL
Qty: 30 | Refills: 0 | OUTPATIENT
Start: 2017-02-21 | End: 2017-03-03

## 2017-02-21 RX ORDER — AZTREONAM 2 G
1 VIAL (EA) INJECTION
Qty: 14 | Refills: 0 | OUTPATIENT
Start: 2017-02-21 | End: 2017-02-28

## 2017-02-21 RX ADMIN — SODIUM CHLORIDE 125 MILLILITER(S): 9 INJECTION INTRAMUSCULAR; INTRAVENOUS; SUBCUTANEOUS at 09:10

## 2017-02-21 RX ADMIN — PIPERACILLIN AND TAZOBACTAM 25 GRAM(S): 4; .5 INJECTION, POWDER, LYOPHILIZED, FOR SOLUTION INTRAVENOUS at 05:26

## 2017-02-21 RX ADMIN — ONDANSETRON 4 MILLIGRAM(S): 8 TABLET, FILM COATED ORAL at 05:27

## 2017-02-21 NOTE — DISCHARGE NOTE ADULT - CARE PROVIDERS DIRECT ADDRESSES
,DirectAddress_Unknown,[email or web address],DirectAddress_Unknown

## 2017-02-21 NOTE — PROGRESS NOTE ADULT - PROVIDER SPECIALTY LIST ADULT
Hospitalist
Infectious Disease
Infectious Disease
Surgery

## 2017-02-21 NOTE — DISCHARGE NOTE ADULT - CARE PROVIDER_API CALL
Les Leigh (MD), Internal Medicine  [address] [place] [address]  Phone: [phone]  Fax: [phone]    Dillon Martin), Internal Medicine; Nephrology  [address] [facility] [address] [place] [id number]  Phone: [phone]  Fax: [phone]    Donny Felix), Surgery  [address] [place] [id number]  Phone: [phone]  Fax: [phone]

## 2017-02-21 NOTE — DISCHARGE NOTE ADULT - CARE PLAN
Principal Discharge DX:	Cellulitis of groin, left  Goal:	improved with opening of the wound will follow up with surgery  Instructions for follow-up, activity and diet:	regular diet  Secondary Diagnosis:	JAMES (acute kidney injury)  Goal:	patient discharged with rising creatnine  secondary to vancomycin urged to follow up with primary care doctor and nephrology for lab work  Secondary Diagnosis:	Leukocytosis, unspecified type  Secondary Diagnosis:	Seizure disorder  Goal:	stable continue home meds

## 2017-02-21 NOTE — PROGRESS NOTE ADULT - SUBJECTIVE AND OBJECTIVE BOX
Patient seen and examined bedside resting comfortably s/p I&D of L groin abscess on 2/19.  Improving L groin pain at I&D site. Still c/o "kidney"/back pain.   Denies N/V. Tolerating diet.   Voiding normally.   Denies fever, chills, chest pain or SOB.  Ambulating    T(F): 97.5, Max: 98.2 (02-21 @ 00:03)  HR: 77 (77 - 80)  BP: 137/82 (111/76 - 137/82)  RR: 16 (15 - 16)  SpO2: 99% (97% - 99%)    PHYSICAL EXAM:  General: NAD, WDWN  Heart: +S1, S2. Regular rate and rhythm   Lungs: CTA b/l. Good inspiratory effort   Abdomen: non distended. +BS. soft. non tender  : Dressing and packing removed from L groin. Dressing with minimal serosang drainage. Induration around incision site improving. +Tenderness. Wound irrigated with NS. No blood or purulent drainage from site. 1/2 inch iodoform wick placed to maintain wound patency. Dressed with gauze and tegaderm. Patient tolerated well. No SP tenderness. +right CVA tenderness   Extremities: No LE edema. No calf tenderness    LABS:                        11.9   16.1  )-----------( 281      ( 21 Feb 2017 08:22 )             34.4     21 Feb 2017 08:22    144    |  113    |  12     ----------------------------<  87     3.7     |  23     |  3.68     Ca    8.0        21 Feb 2017 08:22  Phos  3.8       20 Feb 2017 07:39  Mg     2.1       20 Feb 2017 07:39    TPro  5.6    /  Alb  2.3    /  TBili  0.6    /  DBili  x      /  AST  11     /  ALT  26     /  AlkPhos  60     21 Feb 2017 08:22      I&O's Detail  I & Os for 24h ending 21 Feb 2017 07:00  =============================================  IN:    sodium chloride 0.9%.: 1500 ml    Oral Fluid: 460 ml    Solution: 100 ml    Total IN: 2060 ml  ---------------------------------------------  OUT:    Total OUT: 0 ml  ---------------------------------------------  Total NET: 2060 ml    I & Os for current day (as of 21 Feb 2017 13:01)  =============================================  IN:    Oral Fluid: 300 ml    Total IN: 300 ml  ---------------------------------------------  OUT:    Total OUT: 0 ml  ---------------------------------------------  Total NET: 300 ml    Culture Results:   Moderate Staphylococcus aureus (02.19.17 @ 23:34)    Impression: 32 y/o M with ?possible seizure d/o s/p bedside I&D of L groin abscess on 2/19. ATN 2/2 to Vanco. Culture reveals moderate S. Aureus. WBC trending down. Creatinine trending upwards.     Plan:   -Continue daily wound care  -Continue Zosyn per ID  -Trend WBC & renal function. F/u labs   -Continue pain management   -Will discuss with Dr. Sidhu for possible D/C today    SOPHIA Webb Patient seen and examined bedside resting comfortably s/p I&D of L groin abscess on 2/19.  Improving L groin pain at I&D site. Still c/o "kidney"/back pain.   Denies N/V. Tolerating diet.   Voiding normally.   Denies fever, chills, chest pain or SOB.  Ambulating    T(F): 97.5, Max: 98.2 (02-21 @ 00:03)  HR: 77 (77 - 80)  BP: 137/82 (111/76 - 137/82)  RR: 16 (15 - 16)  SpO2: 99% (97% - 99%)    PHYSICAL EXAM:  General: NAD, WDWN  Heart: +S1, S2. Regular rate and rhythm   Lungs: CTA b/l. Good inspiratory effort   Abdomen: non distended. +BS. soft. non tender  : Dressing and packing removed from L groin. Dressing with minimal serosang drainage. Induration around incision site improving. +Tenderness. Wound irrigated with NS. No blood or purulent drainage from site. 1/2 inch iodoform wick placed to maintain wound patency. Dressed with gauze and tegaderm. Patient tolerated well. No SP tenderness. +right CVA tenderness   Extremities: No LE edema. No calf tenderness    LABS:                        11.9   16.1  )-----------( 281      ( 21 Feb 2017 08:22 )             34.4     21 Feb 2017 08:22    144    |  113    |  12     ----------------------------<  87     3.7     |  23     |  3.68     Ca    8.0        21 Feb 2017 08:22  Phos  3.8       20 Feb 2017 07:39  Mg     2.1       20 Feb 2017 07:39    TPro  5.6    /  Alb  2.3    /  TBili  0.6    /  DBili  x      /  AST  11     /  ALT  26     /  AlkPhos  60     21 Feb 2017 08:22      I&O's Detail  I & Os for 24h ending 21 Feb 2017 07:00  =============================================  IN:    sodium chloride 0.9%.: 1500 ml    Oral Fluid: 460 ml    Solution: 100 ml    Total IN: 2060 ml  ---------------------------------------------  OUT:    Total OUT: 0 ml  ---------------------------------------------  Total NET: 2060 ml    I & Os for current day (as of 21 Feb 2017 13:01)  =============================================  IN:    Oral Fluid: 300 ml    Total IN: 300 ml  ---------------------------------------------  OUT:    Total OUT: 0 ml  ---------------------------------------------  Total NET: 300 ml    Culture Results:   Moderate Staphylococcus aureus (02.19.17 @ 23:34)    Impression: 30 y/o M with ?possible seizure d/o s/p bedside I&D of L groin abscess on 2/19. ATN 2/2 to Vanco. Culture reveals moderate S. Aureus. WBC trending down. Creatinine trending upwards.     Plan:   -Continue daily wound care  -Continue Zosyn per ID  -Trend WBC & renal function. F/u labs   -Continue pain management   -Will discuss with Dr. Sidhu for possible D/C today    SOPHIA Webb     addendum:   Pt was discharged by medicine on PO Bactrim DS

## 2017-02-21 NOTE — DISCHARGE NOTE ADULT - PLAN OF CARE
improved with opening of the wound will follow up with surgery patient discharged with rising creatnine  secondary to vancomycin urged to follow up with primary care doctor and nephrology for lab work stable continue home meds regular diet

## 2017-02-21 NOTE — DISCHARGE NOTE ADULT - PATIENT PORTAL LINK FT
“You can access the FollowHealth Patient Portal, offered by [facility], by registering with the following website: [email or web address]”

## 2017-02-21 NOTE — DISCHARGE NOTE ADULT - MEDICATION SUMMARY - MEDICATIONS TO TAKE
I will START or STAY ON the medications listed below when I get home from the hospital:     mg oral tablet  -- 1 tab(s) by mouth every 8 hours  -- Do not take this drug if you are pregnant.  It is very important that you take or use this exactly as directed.  Do not skip doses or discontinue unless directed by your doctor.  May cause drowsiness or dizziness.  Obtain medical advice before taking any non-prescription drugs as some may affect the action of this medication.  Take with food or milk.    -- Indication: For pain     acetaminophen 325 mg oral tablet  -- 2 tab(s) by mouth every 6 hours, As needed, Mild Pain (1 - 3)  -- Indication: For fever    acetaminophen 325 mg oral tablet  -- 2 tab(s) by mouth every 6 hours, As needed, For Temp greater than 38 C (100.4 F)  -- Indication: For fever    Keppra 500 mg oral tablet  -- 1 tab(s) by mouth 2 times a day  -- Check with your doctor before becoming pregnant.  It is very important that you take or use this exactly as directed.  Do not skip doses or discontinue unless directed by your doctor.  May cause drowsiness or dizziness.  Obtain medical advice before taking any non-prescription drugs as some may affect the action of this medication.  Swallow whole.  Do not crush.  This drug may impair the ability to drive or operate machinery.  Use care until you become familiar with its effects.    -- Indication: For Seizure disorder    ALPRAZolam 0.25 mg oral tablet  -- 1 tab(s) by mouth every 8 hours - for anxiety  -- Avoid grapefruit and grapefruit juice while taking this medication.  Caution federal law prohibits the transfer of this drug to any person other  than the person for whom it was prescribed.  Do not take this drug if you are pregnant.  May cause drowsiness.  Alcohol may intensify this effect.  Use care when operating dangerous machinery.    -- Indication: For Anxiety    Bactrim  mg-160 mg oral tablet  -- 1 tab(s) by mouth 2 times a day  -- Avoid prolonged or excessive exposure to direct and/or artificial sunlight while taking this medication.  Finish all this medication unless otherwise directed by prescriber.  Medication should be taken with plenty of water.    -- Indication: For CELLULITIS OF GROIN OF LEFT SIDE

## 2017-02-21 NOTE — PROGRESS NOTE ADULT - SUBJECTIVE AND OBJECTIVE BOX
INTERVAL HPI/OVERNIGHT EVENTS:    Patient sitting comfortably.  Complaint of bilateral flank pain.  Groin pain improving.  Tolerating diet with no complaint of nausea/vomiting.  +Flatus/BM.  No complaint of urinary symptom.        Vital Signs Last 24 Hrs  T(C): 36.8, Max: 36.8 ( @ 00:03)  T(F): 98.2, Max: 98.2 ( @ 00:03)  HR: 79 (72 - 80)  BP: 123/76 (111/76 - 129/84)  BP(mean): --  RR: 15 (15 - 16)  SpO2: 98% (97% - 99%)    MEDICATIONS  (STANDING):  piperacillin/tazobactam IVPB. 3.375Gram(s) IV Intermittent every 8 hours  levETIRAcetam 500milliGRAM(s) Oral two times a day  nicotine -  14 mG/24Hr(s) Patch 1patch Transdermal daily  sodium chloride 0.9%. 1000milliLiter(s) IV Continuous <Continuous>    MEDICATIONS  (PRN):  acetaminophen   Tablet. 650milliGRAM(s) Oral every 6 hours PRN Mild Pain (1 - 3)  oxyCODONE  5 mG/acetaminophen 325 mG 2Tablet(s) Oral every 4 hours PRN Moderate Pain (4 - 6)  ondansetron Injectable 4milliGRAM(s) IV Push every 6 hours PRN Nausea and/or Vomiting  acetaminophen   Tablet 650milliGRAM(s) Oral every 6 hours PRN For Temp greater than 38 C (100.4 F)  morphine  - Injectable 2milliGRAM(s) IV Push every 4 hours PRN Severe Pain (7 - 10)      PHYSICAL EXAM:    GENERAL: NAD  HEAD:  Atraumatic, Normocephalic  EYES: EOMI, PERRLA, conjunctiva and sclera clear  CHEST/LUNG: Clear to ausculation, bilaterally   HEART: S1S2  ABDOMEN: non distended, +BS, soft, non tender, no guarding  Groin -- induration improving.    EXTREMITIES:  calf soft, non tender     I&O's Detail  I & Os for 24h ending 2017 07:00  =============================================  IN:    sodium chloride 0.9%.: 1375 ml    Oral Fluid: 240 ml    Solution: 200 ml    Total IN: 1815 ml  ---------------------------------------------  OUT:    Total OUT: 0 ml  ---------------------------------------------  Total NET: 1815 ml    I & Os for current day (as of 2017 05:34)  =============================================  IN:    sodium chloride 0.9%.: 1500 ml    Oral Fluid: 460 ml    Solution: 100 ml    Total IN: 2060 ml  ---------------------------------------------  OUT:    Total OUT: 0 ml  ---------------------------------------------  Total NET: 2060 ml      LABS:                        13.0   17.6  )-----------( 267      ( 2017 07:39 )             37.0     2017 07:39    141    |  108    |  13     ----------------------------<  92     3.5     |  23     |  3.54     Ca    7.9        2017 07:39  Phos  3.8       2017 07:39  Mg     2.1       2017 07:39    TPro  5.7    /  Alb  2.5    /  TBili  0.8    /  DBili  x      /  AST  17     /  ALT  26     /  AlkPhos  69     2017 07:39      Urinalysis Basic - ( 2017 21:16 )    Color: Yellow / Appearance: Clear / S.005 / pH: x  Gluc: x / Ketone: Negative  / Bili: Negative / Urobili: Negative mg/dL   Blood: x / Protein: 30 mg/dL / Nitrite: Negative   Leuk Esterase: Negative / RBC: 6-10 /HPF / WBC >50   Sq Epi: x / Non Sq Epi: Occasional / Bacteria: Many      Culture Results:   Moderate Staphylococcus aureus ( @ 23:34)    Impression:  32 y/o M with ?possible seizure d/o s/p bedside I&D of L groin abscess on . ATN  to Vanco. Leukocytosis (17.6).     Plan:  Wound care   cont Abx ffup Sensitivity   appreciate renal consult/follow up -- cont to monitor renal function, I/O   prophylactic measure:  incentive spirometry, venodynes, DVT prophylaxis, OOB to ambulate, bowel regimen   will discuss with surgical attending re:  DC planning

## 2017-02-21 NOTE — PROGRESS NOTE ADULT - SUBJECTIVE AND OBJECTIVE BOX
I have seen and examined the patient and lucio with Gm Guajardo the surgical PA's note.  Modifications have been entered where needed and supplemental orders ordered as needed.  We will check the cultures and if ID can find an appropriate PO ABX will discharge with VNS and office follow-up.    Dr. Donny Felix contact information [phone]

## 2017-02-21 NOTE — DISCHARGE NOTE ADULT - HOSPITAL COURSE
30 y/o male admitted with left groin cellulitis initially treated with zosyn and vancomycin/ repeat us revealed abscess which was able to be drained by surgery.   Staph Aureus grew from wound culture  Patient experienced acute kidney injury Creatnine on admission was 0.72 on discharge was 3.68   White count on admission was 21.2 discharged at 16.1    Patient was informed of the seriousness of his renal injury and urged to stay for further management   Instrctions given to patient to follow up with his [facility] doctor, renal doctor  assigned  and surgeon

## 2017-02-22 RX ORDER — ACETAMINOPHEN 500 MG
1 TABLET ORAL
Qty: 90 | Refills: 0 | OUTPATIENT
Start: 2017-02-22 | End: 2017-03-24

## 2017-02-23 DIAGNOSIS — B95.61 METHICILLIN SUSCEPTIBLE STAPHYLOCOCCUS AUREUS INFECTION AS THE CAUSE OF DISEASES CLASSIFIED ELSEWHERE: ICD-10-CM

## 2017-02-23 DIAGNOSIS — N17.9 ACUTE KIDNEY FAILURE, UNSPECIFIED: ICD-10-CM

## 2017-02-23 DIAGNOSIS — L03.314 CELLULITIS OF GROIN: ICD-10-CM

## 2017-02-23 DIAGNOSIS — G40.909 EPILEPSY, UNSPECIFIED, NOT INTRACTABLE, WITHOUT STATUS EPILEPTICUS: ICD-10-CM

## 2017-02-23 LAB — STREP DNASE B TITR SER: < 95 U/ML — SIGNIFICANT CHANGE UP

## 2017-02-27 ENCOUNTER — EMERGENCY (EMERGENCY)
Facility: HOSPITAL | Age: 32
LOS: 0 days | Discharge: HOME | End: 2017-02-27
Attending: EMERGENCY MEDICINE
Payer: COMMERCIAL

## 2017-02-27 VITALS
TEMPERATURE: 98 F | WEIGHT: 160.06 LBS | DIASTOLIC BLOOD PRESSURE: 86 MMHG | HEIGHT: 68 IN | HEART RATE: 88 BPM | OXYGEN SATURATION: 99 % | SYSTOLIC BLOOD PRESSURE: 147 MMHG | RESPIRATION RATE: 19 BRPM

## 2017-02-27 DIAGNOSIS — Z48.02 ENCOUNTER FOR REMOVAL OF SUTURES: ICD-10-CM

## 2017-02-27 PROCEDURE — 99282 EMERGENCY DEPT VISIT SF MDM: CPT

## 2017-04-10 NOTE — PROGRESS NOTE ADULT - PROBLEM SELECTOR PROBLEM 3
Seizure disorder
JAMES (acute kidney injury)
JAMES (acute kidney injury)
Leukocytosis, unspecified type
Seizure disorder
WDL

## 2018-01-25 NOTE — PROGRESS NOTE ADULT - PROBLEM/PLAN-3
Prescription filled per protocol  Sent to [facility]    
DISPLAY PLAN FREE TEXT

## 2018-07-02 ENCOUNTER — EMERGENCY (EMERGENCY)
Facility: HOSPITAL | Age: 33
LOS: 1 days | Discharge: LEFT BEFORE DISCHARGE | End: 2018-07-02
Attending: EMERGENCY MEDICINE
Payer: SELF-PAY

## 2018-07-02 VITALS
WEIGHT: 149.91 LBS | HEIGHT: 68 IN | TEMPERATURE: 98 F | SYSTOLIC BLOOD PRESSURE: 114 MMHG | HEART RATE: 66 BPM | RESPIRATION RATE: 18 BRPM | DIASTOLIC BLOOD PRESSURE: 86 MMHG | OXYGEN SATURATION: 97 %

## 2018-07-02 LAB
ALBUMIN SERPL ELPH-MCNC: 3.6 G/DL — SIGNIFICANT CHANGE UP (ref 3.5–5)
ALP SERPL-CCNC: 86 U/L — SIGNIFICANT CHANGE UP (ref 40–120)
ALT FLD-CCNC: 43 U/L DA — SIGNIFICANT CHANGE UP (ref 10–60)
ANION GAP SERPL CALC-SCNC: 6 MMOL/L — SIGNIFICANT CHANGE UP (ref 5–17)
AST SERPL-CCNC: 27 U/L — SIGNIFICANT CHANGE UP (ref 10–40)
BASOPHILS # BLD AUTO: 0.1 K/UL — SIGNIFICANT CHANGE UP (ref 0–0.2)
BASOPHILS NFR BLD AUTO: 0.9 % — SIGNIFICANT CHANGE UP (ref 0–2)
BILIRUB SERPL-MCNC: 0.9 MG/DL — SIGNIFICANT CHANGE UP (ref 0.2–1.2)
BUN SERPL-MCNC: 11 MG/DL — SIGNIFICANT CHANGE UP (ref 7–18)
CALCIUM SERPL-MCNC: 8.7 MG/DL — SIGNIFICANT CHANGE UP (ref 8.4–10.5)
CHLORIDE SERPL-SCNC: 106 MMOL/L — SIGNIFICANT CHANGE UP (ref 96–108)
CO2 SERPL-SCNC: 28 MMOL/L — SIGNIFICANT CHANGE UP (ref 22–31)
CREAT SERPL-MCNC: 0.84 MG/DL — SIGNIFICANT CHANGE UP (ref 0.5–1.3)
EOSINOPHIL # BLD AUTO: 1 K/UL — HIGH (ref 0–0.5)
EOSINOPHIL NFR BLD AUTO: 7.9 % — HIGH (ref 0–6)
ERYTHROCYTE [SEDIMENTATION RATE] IN BLOOD: 8 MM/HR — SIGNIFICANT CHANGE UP (ref 0–15)
GLUCOSE SERPL-MCNC: 112 MG/DL — HIGH (ref 70–99)
HCT VFR BLD CALC: 46.4 % — SIGNIFICANT CHANGE UP (ref 39–50)
HGB BLD-MCNC: 14.5 G/DL — SIGNIFICANT CHANGE UP (ref 13–17)
LYMPHOCYTES # BLD AUTO: 36.5 % — SIGNIFICANT CHANGE UP (ref 13–44)
LYMPHOCYTES # BLD AUTO: 4.5 K/UL — HIGH (ref 1–3.3)
MCHC RBC-ENTMCNC: 27 PG — SIGNIFICANT CHANGE UP (ref 27–34)
MCHC RBC-ENTMCNC: 31.2 GM/DL — LOW (ref 32–36)
MCV RBC AUTO: 86.4 FL — SIGNIFICANT CHANGE UP (ref 80–100)
MONOCYTES # BLD AUTO: 0.5 K/UL — SIGNIFICANT CHANGE UP (ref 0–0.9)
MONOCYTES NFR BLD AUTO: 4.1 % — SIGNIFICANT CHANGE UP (ref 2–14)
NEUTROPHILS # BLD AUTO: 6.2 K/UL — SIGNIFICANT CHANGE UP (ref 1.8–7.4)
NEUTROPHILS NFR BLD AUTO: 50.7 % — SIGNIFICANT CHANGE UP (ref 43–77)
NT-PROBNP SERPL-SCNC: 26 PG/ML — SIGNIFICANT CHANGE UP (ref 0–125)
PLATELET # BLD AUTO: 326 K/UL — SIGNIFICANT CHANGE UP (ref 150–400)
POTASSIUM SERPL-MCNC: 3.6 MMOL/L — SIGNIFICANT CHANGE UP (ref 3.5–5.3)
POTASSIUM SERPL-SCNC: 3.6 MMOL/L — SIGNIFICANT CHANGE UP (ref 3.5–5.3)
PROT SERPL-MCNC: 7.3 G/DL — SIGNIFICANT CHANGE UP (ref 6–8.3)
RBC # BLD: 5.36 M/UL — SIGNIFICANT CHANGE UP (ref 4.2–5.8)
RBC # FLD: 12.1 % — SIGNIFICANT CHANGE UP (ref 10.3–14.5)
SODIUM SERPL-SCNC: 140 MMOL/L — SIGNIFICANT CHANGE UP (ref 135–145)
WBC # BLD: 12.3 K/UL — HIGH (ref 3.8–10.5)
WBC # FLD AUTO: 12.3 K/UL — HIGH (ref 3.8–10.5)

## 2018-07-02 PROCEDURE — 80053 COMPREHEN METABOLIC PANEL: CPT

## 2018-07-02 PROCEDURE — 99283 EMERGENCY DEPT VISIT LOW MDM: CPT

## 2018-07-02 PROCEDURE — 85027 COMPLETE CBC AUTOMATED: CPT

## 2018-07-02 PROCEDURE — 71046 X-RAY EXAM CHEST 2 VIEWS: CPT

## 2018-07-02 PROCEDURE — 99283 EMERGENCY DEPT VISIT LOW MDM: CPT | Mod: 25

## 2018-07-02 PROCEDURE — 71046 X-RAY EXAM CHEST 2 VIEWS: CPT | Mod: 26

## 2018-07-02 PROCEDURE — 85652 RBC SED RATE AUTOMATED: CPT

## 2018-07-02 PROCEDURE — 83880 ASSAY OF NATRIURETIC PEPTIDE: CPT

## 2018-07-02 RX ORDER — IBUPROFEN 200 MG
600 TABLET ORAL ONCE
Qty: 0 | Refills: 0 | Status: COMPLETED | OUTPATIENT
Start: 2018-07-02 | End: 2018-07-02

## 2018-07-02 NOTE — ED PROVIDER NOTE - OBJECTIVE STATEMENT
32 yr old male with no hx presents to ed c/o sole of feet and palms swelling x 3 days worsening.  per pt started at left foot then right foot now at palms.  no fever, no chills, no visual changes, no headache, no numbness or tingling, no sob, no cough, no cp, no palpitations, no leg swelling, no syncope, no abd pain, no n/v/d, no dysuria, no rashes.  pt [place] [facility] 1 [place] ago.  now at palms and soles.  denies any STI.  no joint pain, no penile discharge, no camping or tick/insect bite.
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
## 2018-07-02 NOTE — ED PROVIDER NOTE - PROGRESS NOTE DETAILS
nunez: pt labs neg except for pending syphilus and ua.  pt left and was called by RN.  working dx viral syndrome vs vasculitis.  eloped

## 2018-07-02 NOTE — ED PROVIDER NOTE - MEDICAL DECISION MAKING DETAILS
32 yr old male with no hx presents to ed c/o sole of feet and palms swelling x 3 days worsening.  per pt started at left foot then right foot now at palms.  no fever, no chills, no visual changes, no headache, no numbness or tingling, no sob, no cough, no cp, no palpitations, no leg swelling, no syncope, no abd pain, no n/v/d, no dysuria, no rashes.  pt [place] [facility] [facility] [place] ago.  now at palms and soles.  denies any STI.  no joint pain, no penile discharge, no camping or tick/insect bite.    pt with sole and palm swelling possibly due to viral coxsackie B vs syphilis vs viral syndrome vs chf vs renal vs nephrotic/nephritic vs vasculitis.  will obtain labs give meds and reassess

## 2018-09-16 NOTE — H&P ADULT. - ASSESSMENT
Problem: Patient Centered Care  Goal: Patient preferences are identified and integrated in the patient's plan of care  Interventions:  - What would you like us to know as we care for you?  \"keep me updated\"  - Provide timely, complete, and accurate inform Manage/alleviate anxiety  - Utilize distraction and/or relaxation techniques  - Monitor for opioid side effects  - Notify MD/LIP if interventions unsuccessful or patient reports new pain  - Anticipate increased pain with activity and pre-medicate as approp functional status, cognitive ability or social support system   Outcome: Progressing      Problem: SKIN/TISSUE INTEGRITY - ADULT  Goal: Skin integrity remains intact  INTERVENTIONS  - Assess and document risk factors for pressure ulcer development  - Asses Progressing 32 y/o male with cellulitis of left groin, no known immune deficiency or trauma, no hernia.

## 2019-11-14 NOTE — PATIENT PROFILE ADULT. - TEACHING/LEARNING LEARNING PREFERENCES
Provider Procedure Text (D): After obtaining clear surgical margins the defect was repaired by another provider. verbal instruction

## 2022-03-28 NOTE — ED ADULT NURSE NOTE - PAIN RATING/NUMBER SCALE (0-10): ACTIVITY
Reviewed below information with patient:     Informed that once they get their COVID test :   • Do NOT travel out of home area   • Self -quarantine is recommended to minimizes your risk of exposure before your procedure, if you are unable to self-quarantine, please continue to wear a mask, practice social distancing and perform good hand hygiene.  • Immediately report any new symptoms or suspected/known exposures to COVID-19 cases  to your surgeon’s office  o fever of 100.4 or more  o new cough, or cough that gets worse  o difficulty breathing  o sore throat  o stomach problems: nausea, vomiting or diarrhea  o loss of taste or smell  o chills and/or repeated shaking with chills  o muscle pain  o headache  • Maintain physical distancing (at least 6 feet) at all times both at home and away from home  • Wash hands frequently and thoroughly with soap and water (lather at least 20 seconds) or disinfect with alcohol-based hand  before eating.  This should also be done by the person who will be bringing you to and from the procedure.  • Only one visitor allowed into building. They must remain in designated area assigned by the nursing staff and can not eat in the facility.  The visitor may have a drink if it is covered with a lid or cap.  Please do not drink while care team members are in the room.  • It will be an expectation for the patient/support person to wear a mask at all times during their stay.  Patient is aware they will not get notified of a negative result             0 20-Nov-2021 00:27

## 2024-10-03 ENCOUNTER — EMERGENCY (EMERGENCY)
Facility: HOSPITAL | Age: 39
LOS: 0 days | Discharge: HOME | End: 2024-10-03
Attending: STUDENT IN AN ORGANIZED HEALTH CARE EDUCATION/TRAINING PROGRAM
Payer: MEDICAID

## 2024-10-03 VITALS
OXYGEN SATURATION: 98 % | DIASTOLIC BLOOD PRESSURE: 93 MMHG | HEART RATE: 101 BPM | TEMPERATURE: 98 F | RESPIRATION RATE: 18 BRPM | SYSTOLIC BLOOD PRESSURE: 137 MMHG

## 2024-10-03 VITALS
HEIGHT: 68 IN | SYSTOLIC BLOOD PRESSURE: 121 MMHG | DIASTOLIC BLOOD PRESSURE: 80 MMHG | HEART RATE: 110 BPM | WEIGHT: 220.02 LBS | RESPIRATION RATE: 18 BRPM | OXYGEN SATURATION: 99 % | TEMPERATURE: 98 F

## 2024-10-03 DIAGNOSIS — K21.9 GASTRO-ESOPHAGEAL REFLUX DISEASE WITHOUT ESOPHAGITIS: ICD-10-CM

## 2024-10-03 DIAGNOSIS — K12.1 OTHER FORMS OF STOMATITIS: ICD-10-CM

## 2024-10-03 DIAGNOSIS — K12.0 RECURRENT ORAL APHTHAE: ICD-10-CM

## 2024-10-03 PROCEDURE — 93010 ELECTROCARDIOGRAM REPORT: CPT

## 2024-10-03 PROCEDURE — 99284 EMERGENCY DEPT VISIT MOD MDM: CPT

## 2024-10-03 RX ORDER — DIPHENHYDRAMINE HYDROCHLORIDE AND LIDOCAINE HYDROCHLORIDE AND ALUMINUM HYDROXIDE AND MAGNESIUM HYDRO
10 KIT
Qty: 1 | Refills: 0
Start: 2024-10-03

## 2024-10-03 RX ORDER — DIPHENHYDRAMINE HYDROCHLORIDE AND LIDOCAINE HYDROCHLORIDE AND ALUMINUM HYDROXIDE AND MAGNESIUM HYDRO
10 KIT ONCE
Refills: 0 | Status: COMPLETED | OUTPATIENT
Start: 2024-10-03 | End: 2024-10-03

## 2024-10-03 RX ADMIN — DIPHENHYDRAMINE HYDROCHLORIDE AND LIDOCAINE HYDROCHLORIDE AND ALUMINUM HYDROXIDE AND MAGNESIUM HYDRO 10 MILLILITER(S): KIT at 10:46

## 2024-10-03 NOTE — ED ADULT NURSE NOTE - NSICDXPASTMEDICALHX_GEN_ALL_CORE_FT
PAST MEDICAL HISTORY:  No pertinent past medical history      Mirvaso Pregnancy And Lactation Text: This medication has not been assigned a Pregnancy Risk Category. It is unknown if the medication is excreted in breast milk.

## 2024-10-03 NOTE — ED ADULT NURSE NOTE - CHIEF COMPLAINT QUOTE
pt c/o ulcers on upper gums and tongue for 4 days. c/o pain, swelling at the site. denies fever.  pt also c/o chest pain since this morning as well. history of anxiety

## 2024-10-03 NOTE — ED ADULT TRIAGE NOTE - STATUS:
Intact Colchicine Pregnancy And Lactation Text: This medication is Pregnancy Category C and isn't considered safe during pregnancy. It is excreted in breast milk.

## 2024-10-03 NOTE — ED PROVIDER NOTE - NSFOLLOWUPINSTRUCTIONS_ED_ALL_ED_FT
Rest, drink plenty of fluids.  Advance activity as tolerated.  Continue all previously prescribed medications as directed.  Follow up with your primary care physician in 48-72 hours- bring copies of your results.  Return to the ER for worsening or persistent symptoms, and/or ANY NEW OR CONCERNING SYMPTOMS. If you have issues obtaining follow up, please call: [phone] to obtain a doctor or specialist who takes your insurance in your area.  You may call [phone] to make an appointment with the internal medicine clinic.

## 2024-10-03 NOTE — ED PROVIDER NOTE - CLINICAL SUMMARY MEDICAL DECISION MAKING FREE TEXT BOX
38yo male denies pmh presenting with mouth sores.  Reports spontaneous onset about 4 days ago.  Has been having pain with eating and drinking.  No fever, nausea vomiting.  This morning woke up with chest pain but has hx of gerd which felt similar and has been eating less.  Currently denies.  No swelling.  On exam 3 aphthous appearing ulcers.  No appreciable abscess or dental infection.  Will treat symptomatically and patient will need dental follow up.  Low risk cp, doubt acs and currently denies.  ECG unremarkable.  Patient also requesting chronic psych meds and follow up, will give clinic info. 40yo male denies pmh presenting with mouth sores.  Reports spontaneous onset about 4 days ago.  Has been having pain with eating and drinking.  No fever, nausea vomiting.  This morning woke up with chest pain but has hx of gerd which felt similar and has been eating less.  Currently denies.  No swelling.  On exam 3 aphthous appearing ulcers.  No appreciable abscess or dental infection.  Will treat symptomatically and patient will need dental follow up.  Low risk cp, doubt acs and currently denies.  ECG unremarkable.  Patient also requesting chronic psych meds and follow up, will give clinic info.    MAYKEL Luz: 38 y/o male here with mouth sore x 4 days. Reports having pain with eating and drinking. Pt had cp earlier but has resolved states has history of gerd in the past. Pt currently denies any chest pain. Pt otherwise NAD, well appearing. Will give rx mouthwash and pt also requesting for refill of psych meds, will give outpatient psych info. Pt stable to be discharged home and follow up with dental.

## 2024-10-03 NOTE — ED ADULT NURSE NOTE - OBJECTIVE STATEMENT
39 y.o male, A&Ox4, c./o mouth sores/ ulcers. pt states "I have an infection in my mouth". As per pt, 4 days ago, he noticed "pimples" on the bilateral sides of his mouth. pt states the pimples popped and states "it is now little holes". pt states white substance on side of mouth. Patient denies: any sob, difficulty breathing, dizziness, headache, vision changes, cp, palpations, abdominal pain, n/v/d, fever, chills, numbness, tingling, urinary symptoms, LE swelling. Respirations even and unlabored. pt ambulatory with steady gait. pt states he needs refill on Abilify .5mg.

## 2024-10-03 NOTE — ED PROVIDER NOTE - PATIENT PORTAL LINK FT
You can access the FollowMyHealth Patient Portal offered by [facility] by registering at the following website: [email or web address]. By joining [facility]’s FollowMyHealth portal, you will also be able to view your health information using other applications (apps) compatible with our system.

## 2024-10-03 NOTE — ED ADULT TRIAGE NOTE - INTERNATIONAL TRAVEL
Pt to clinic for Venofer. Pt offers no complaints. Pt tolerated infusion without complications. PIV removed. Pt aware of next appointment on 8/12/24 at 830. AVS declined.    
No

## 2024-10-03 NOTE — ED ADULT NURSE NOTE - NSICDXFAMILYHX_GEN_ALL_CORE_FT
FAMILY HISTORY:  Mother  Still living? Yes, Estimated age: Age Unknown  Family history of essential hypertension, Age at diagnosis: Age Unknown

## 2024-10-03 NOTE — ED PROVIDER NOTE - PHYSICAL EXAMINATION
General appearance: Nontoxic appearing, conversant, afebrile    Eyes: anicteric sclerae, KALEB, EOMI   HENT: Atraumatic; oropharynx clear, MMM and no ulcerations, no pharyngeal erythema or exudate, L lateral tongue aphthous ulcer, 2 ulceration mucosa of upper b/l cheeks near molars   Neck: Trachea midline; Full range of motion, supple   Pulm: CTA bl, normal respiratory effort and no intercostal retractions, normal work of breathing   CV: RRR, No murmurs, rubs, or gallops   Abdomen: Soft, non-tender, non-distended; no guarding or rebound   Extremities: No peripheral edema, no gross deformities, FROM x4   Skin: Dry, normal temperature, turgor and texture; no rash   Psych: Appropriate affect, cooperative

## 2024-10-03 NOTE — ED PROVIDER NOTE - NSFOLLOWUPCLINICS_GEN_ALL_ED_FT
[facility]  Dental  [address] [place] [id number]  Phone: [phone]  Fax:   Follow Up Time: 4-6 Days    Oral & Maxillofacial Surgery  Department of Dental Medicine  [facility] [place] [id number]  Phone: [phone]  Fax: [phone]  Follow Up Time: 4-6 Days

## 2025-04-24 NOTE — ED ADULT NURSE NOTE - PRO INTERPRETER NEED 2
Name: Hyacinth Parra      : 1969      MRN: [id number]  Encounter Provider: Michele Knight MD  Encounter Date: 2025   Encounter department: [facility] INTERNAL MEDICINE [place]    Assessment & Plan  Liver lesion, right lobe  Patient presented to ED with several months of upper abdominal pain.   CT imaging shows Indeterminate 2.3 x 3.0 cm hypodense right hepatic lobe lesion.   Hepatic function panel shows normal liver function  Nonemergent MRI abdomen recommended  Patient has appointment with GI on  for further follow up       Encounter for medication refill    Orders:    ramipril (ALTACE) 2.5 mg capsule; Take 1 capsule (2.5 mg total) by mouth daily    Costochondritis  Patient complains of right sided paraspinal pain that wraps into right upper abdominal quadrant that is worse with breathing  Tenderness to palpation in right paraspinal muscles on palpation  No spinal tenderness or neurological deficits  Will trial on voltaren and lidocaine patch.  Patient to call office if symptoms fail to resolve  Orders:    lidocaine (Lidoderm) 5 %; Apply 1 patch topically over 12 hours daily Remove & Discard patch within 12 hours or as directed by MD    Diclofenac Sodium (VOLTAREN) 1 %; Apply 2 g topically 4 (four) times a day Apply to area of back where pain occurs    Type 2 diabetes mellitus with hyperglycemia, with long-term current use of insulin ([facility])    Lab Results   Component Value Date    HGBA1C 8.3 (H) 2025     Maintained on lantus 30 units qhs  Patient not consistently monitoring blood sugar at home  Patient asked to monitor blood sugar with test strips and record them for next visit for further diabetes management                  History of Present Illness     Patient presents to clinic to discuss most recent CT results from last ED visit.  Patient has complained of right sided flank and RUQ pain for several months.  Pain is worse with deep breathing.  She denies any spinal tenderness, numbness or  weakness in the extremities.  Her most recent hepatic function panel and metabolic panel shows normal liver and kidney function, respectively.  CT abdomen shows indeterminate 2.3x3.0 cm hypodense lesion in right hepatic lobe.  Patient has appointment with GI on 5/5 to follow up with image findings.      Review of Systems   Constitutional:  Negative for chills and fever.   HENT:  Negative for ear pain and sore throat.    Eyes:  Negative for pain and visual disturbance.   Respiratory:  Negative for cough and shortness of breath.    Cardiovascular:  Negative for chest pain and palpitations.   Gastrointestinal:  Negative for abdominal pain and vomiting.   Genitourinary:  Negative for dysuria and hematuria.   Musculoskeletal:  Positive for myalgias. Negative for arthralgias and back pain.   Skin:  Negative for color change and rash.   Neurological:  Negative for seizures and syncope.   All other systems reviewed and are negative.    Past Medical History:   Diagnosis Date    Asthma     Depression     Diabetes mellitus (HCC)     type 2    Hyperlipidemia     Hypertension      Past Surgical History:   Procedure Laterality Date    KNEE ARTHROCENTESIS      MOUTH SURGERY       Family History   Problem Relation Age of Onset    Diabetes Mother     Diabetes Father     Ovarian cancer Sister     Colon cancer Brother      Social History     Tobacco Use    Smoking status: Every Day     Current packs/day: 1.00     Average packs/day: 1 pack/day for 35.0 years (35.0 ttl pk-yrs)     Types: Cigarettes    Smokeless tobacco: Never   Vaping Use    Vaping status: Never Used   Substance and Sexual Activity    Alcohol use: Not Currently    Drug use: Never    Sexual activity: Yes     Partners: Male     Birth control/protection: None     Comment: last intercourse 2 months     Current Outpatient Medications on File Prior to Visit   Medication Sig    ALPRAZolam (XANAX) 0.5 mg tablet Take 1 tablet (0.5 mg total) by mouth daily at bedtime as needed  for sleep or anxiety    Aspirin Low Dose 81 MG EC tablet TAKE 1 TABLET BY MOUTH EVERY DAY    Cholecalciferol (Vitamin D3) 50 MCG (2000 UT) TABS Take 1 tablet (2,000 Units total) by mouth daily    clotrimazole-betamethasone (LOTRISONE) 1-0.05 % cream Apply topically 2 (two) times a day for 7 days To affected areas of vulva for itching.    Continuous Glucose Sensor (Dexcom G6 Sensor) MISC 3 PACK SENSOR FOR CONTINUOUS GLUCOSE MONITORING    Continuous Glucose Transmitter (Dexcom G6 Transmitter) MISC 1 TRANSMITTED EVERY 3 MONTHS FOR CONTINUOUS GLUCOSE MONITORING    Insulin Glargine Solostar (Lantus SoloStar) 100 UNIT/ML SOPN Inject 0.36 mL (36 Units total) as directed daily at bedtime    metFORMIN (GLUCOPHAGE) 1000 MG tablet Take 1 tablet (1,000 mg total) by mouth 2 (two) times a day with meals    Multiple Vitamins-Minerals (ONE-A-DAY FOR HER VITACRAVES PO) Take by mouth    prednisoLONE acetate (PRED FORTE) 1 % ophthalmic suspension PLEASE SEE ATTACHED FOR DETAILED DIRECTIONS    QUEtiapine (SEROquel) 400 MG tablet TAKE 1 TABLET (400 MG TOTAL) BY MOUTH DAILY AT BEDTIME    rosuvastatin (CRESTOR) 20 MG tablet Take 1 tablet (20 mg total) by mouth daily    [DISCONTINUED] ramipril (ALTACE) 2.5 mg capsule Take 1 capsule (2.5 mg total) by mouth daily    BD Pen Needle Maile 2nd Gen 32G X 4 MM MISC Inject under the skin in the morning Use as directed (Patient not taking: Reported on 4/23/2025)    Blood Glucose Monitoring Suppl (ONE TOUCH ULTRA 2) w/Device KIT Check blood sugars three times daily. Please substitute with appropriate alternative as covered by patient's insurance. Dx: E11.65 (Patient not taking: Reported on 4/23/2025)    Blood Glucose Monitoring Suppl (OneTouch Verio Reflect) w/Device KIT Check blood sugars three times daily. Please substitute with appropriate alternative as covered by patient's insurance. Dx: E11.65 (Patient not taking: Reported on 4/23/2025)    estradiol (ESTRACE VAGINAL) 0.1 mg/g vaginal cream  "Insert 1 g into the vagina 2 (two) times a week (Patient not taking: Reported on 4/23/2025)    glucose blood (OneTouch Ultra) test strip Check blood sugars three times daily. Please substitute with appropriate alternative as covered by patient's insurance. Dx: E11.65 (Patient not taking: Reported on 11/5/2024)    Insulin Pen Needle (BD Pen Needle Maile U/F) 32G X 4 MM MISC Use daily as directed with insulin pen (Patient not taking: Reported on 1/8/2025)    OneTouch Delica Lancets 33G MISC Check blood sugars three times daily. Please substitute with appropriate alternative as covered by patient's insurance. Dx: E11.65 (Patient not taking: Reported on 1/8/2025)    pantoprazole (PROTONIX) 20 mg tablet TAKE 1 TABLET BY MOUTH DAILY BEFORE BREAKFAST. (Patient not taking: Reported on 4/23/2025)     No Known Allergies  Immunization History   Administered Date(s) Administered    Tdap 09/21/2021     Objective   /70 (BP Location: Left arm, Patient Position: Sitting, Cuff Size: Adult)   Pulse 89   Temp 97.7 °F (36.5 °C) (Tympanic)   Resp 16   Ht 5' 5\" (1.651 m)   Wt 74.4 kg (164 lb)   SpO2 99%   BMI 27.29 kg/m²     Physical Exam  Vitals and nursing note reviewed.   Constitutional:       General: She is not in acute distress.     Appearance: She is well-developed.   HENT:      Head: Normocephalic and atraumatic.   Eyes:      Conjunctiva/sclera: Conjunctivae normal.   Cardiovascular:      Rate and Rhythm: Normal rate and regular rhythm.      Heart sounds: No murmur heard.  Pulmonary:      Effort: Pulmonary effort is normal. No respiratory distress.      Breath sounds: Normal breath sounds.   Abdominal:      Palpations: Abdomen is soft.      Tenderness: There is no abdominal tenderness.   Musculoskeletal:         General: No swelling.      Cervical back: Neck supple.      Comments: Tenderness to palpation of right parasternal muscles and right flank   Skin:     General: Skin is warm and dry.      Capillary Refill: " English Capillary refill takes less than 2 seconds.   Neurological:      Mental Status: She is alert.   Psychiatric:         Mood and Affect: Mood normal.